# Patient Record
Sex: FEMALE | Race: WHITE | Employment: UNEMPLOYED | URBAN - METROPOLITAN AREA
[De-identification: names, ages, dates, MRNs, and addresses within clinical notes are randomized per-mention and may not be internally consistent; named-entity substitution may affect disease eponyms.]

---

## 2018-04-03 ENCOUNTER — OFFICE VISIT (OUTPATIENT)
Dept: FAMILY MEDICINE CLINIC | Age: 53
End: 2018-04-03

## 2018-04-03 ENCOUNTER — TELEPHONE (OUTPATIENT)
Dept: FAMILY MEDICINE CLINIC | Age: 53
End: 2018-04-03

## 2018-04-03 VITALS
OXYGEN SATURATION: 97 % | TEMPERATURE: 98.5 F | HEART RATE: 84 BPM | DIASTOLIC BLOOD PRESSURE: 100 MMHG | HEIGHT: 67 IN | SYSTOLIC BLOOD PRESSURE: 142 MMHG | WEIGHT: 293 LBS | BODY MASS INDEX: 45.99 KG/M2

## 2018-04-03 DIAGNOSIS — R20.2 NUMBNESS AND TINGLING IN LEFT HAND: ICD-10-CM

## 2018-04-03 DIAGNOSIS — R43.8 METALLIC TASTE: ICD-10-CM

## 2018-04-03 DIAGNOSIS — L11.1 GROVER'S DISEASE: ICD-10-CM

## 2018-04-03 DIAGNOSIS — Z91.09 ENVIRONMENTAL ALLERGIES: ICD-10-CM

## 2018-04-03 DIAGNOSIS — R20.0 NUMBNESS AND TINGLING IN LEFT HAND: ICD-10-CM

## 2018-04-03 DIAGNOSIS — I10 ESSENTIAL HYPERTENSION: Primary | ICD-10-CM

## 2018-04-03 DIAGNOSIS — R06.83 SNORING: ICD-10-CM

## 2018-04-03 DIAGNOSIS — E66.09 OBESITY DUE TO EXCESS CALORIES WITH SERIOUS COMORBIDITY, UNSPECIFIED CLASSIFICATION: ICD-10-CM

## 2018-04-03 DIAGNOSIS — H93.13 TINNITUS OF BOTH EARS: ICD-10-CM

## 2018-04-03 DIAGNOSIS — I10 ESSENTIAL HYPERTENSION: ICD-10-CM

## 2018-04-03 LAB
BASOPHILS ABSOLUTE: 0.1 K/UL (ref 0–0.2)
BASOPHILS RELATIVE PERCENT: 1.2 %
EOSINOPHILS ABSOLUTE: 0.2 K/UL (ref 0–0.6)
EOSINOPHILS RELATIVE PERCENT: 4.7 %
HCT VFR BLD CALC: 42.7 % (ref 36–48)
HEMOGLOBIN: 14.3 G/DL (ref 12–16)
LYMPHOCYTES ABSOLUTE: 1.4 K/UL (ref 1–5.1)
LYMPHOCYTES RELATIVE PERCENT: 26 %
MCH RBC QN AUTO: 29.7 PG (ref 26–34)
MCHC RBC AUTO-ENTMCNC: 33.4 G/DL (ref 31–36)
MCV RBC AUTO: 89 FL (ref 80–100)
MONOCYTES ABSOLUTE: 0.5 K/UL (ref 0–1.3)
MONOCYTES RELATIVE PERCENT: 8.7 %
NEUTROPHILS ABSOLUTE: 3.1 K/UL (ref 1.7–7.7)
NEUTROPHILS RELATIVE PERCENT: 59.4 %
PDW BLD-RTO: 13.8 % (ref 12.4–15.4)
PLATELET # BLD: 200 K/UL (ref 135–450)
PMV BLD AUTO: 8.7 FL (ref 5–10.5)
RBC # BLD: 4.8 M/UL (ref 4–5.2)
WBC # BLD: 5.3 K/UL (ref 4–11)

## 2018-04-03 PROCEDURE — 99204 OFFICE O/P NEW MOD 45 MIN: CPT | Performed by: FAMILY MEDICINE

## 2018-04-03 RX ORDER — LOSARTAN POTASSIUM 50 MG/1
50 TABLET ORAL DAILY
Qty: 30 TABLET | Refills: 3 | Status: SHIPPED | OUTPATIENT
Start: 2018-04-03 | End: 2018-04-24 | Stop reason: SDUPTHER

## 2018-04-03 RX ORDER — ASPIRIN 81 MG/1
81 TABLET, CHEWABLE ORAL
COMMUNITY
Start: 2018-01-04

## 2018-04-03 RX ORDER — MONTELUKAST SODIUM 10 MG/1
10 TABLET ORAL DAILY
COMMUNITY
Start: 2018-03-22 | End: 2018-10-29 | Stop reason: SDUPTHER

## 2018-04-03 RX ORDER — AZELASTINE HCL 205.5 UG/1
2 SPRAY NASAL PRN
COMMUNITY
Start: 2018-02-05 | End: 2018-10-29 | Stop reason: SDUPTHER

## 2018-04-03 RX ORDER — METOPROLOL SUCCINATE 25 MG/1
25 TABLET, EXTENDED RELEASE ORAL DAILY
COMMUNITY
End: 2018-04-03

## 2018-04-04 LAB
A/G RATIO: 1.5 (ref 1.1–2.2)
ALBUMIN SERPL-MCNC: 3.9 G/DL (ref 3.4–5)
ALP BLD-CCNC: 74 U/L (ref 40–129)
ALT SERPL-CCNC: 23 U/L (ref 10–40)
ANA INTERPRETATION: NORMAL
ANION GAP SERPL CALCULATED.3IONS-SCNC: 12 MMOL/L (ref 3–16)
ANTI-NUCLEAR ANTIBODY (ANA): NEGATIVE
AST SERPL-CCNC: 18 U/L (ref 15–37)
BILIRUB SERPL-MCNC: 0.4 MG/DL (ref 0–1)
BUN BLDV-MCNC: 11 MG/DL (ref 7–20)
CALCIUM SERPL-MCNC: 8.4 MG/DL (ref 8.3–10.6)
CHLORIDE BLD-SCNC: 104 MMOL/L (ref 99–110)
CO2: 27 MMOL/L (ref 21–32)
CREAT SERPL-MCNC: 0.7 MG/DL (ref 0.6–1.1)
GFR AFRICAN AMERICAN: >60
GFR NON-AFRICAN AMERICAN: >60
GLOBULIN: 2.6 G/DL
GLUCOSE BLD-MCNC: 90 MG/DL (ref 70–99)
POTASSIUM SERPL-SCNC: 4.8 MMOL/L (ref 3.5–5.1)
SEDIMENTATION RATE, ERYTHROCYTE: 8 MM/HR (ref 0–30)
SODIUM BLD-SCNC: 143 MMOL/L (ref 136–145)
TOTAL PROTEIN: 6.5 G/DL (ref 6.4–8.2)
TSH SERPL DL<=0.05 MIU/L-ACNC: 2.01 UIU/ML (ref 0.27–4.2)
VITAMIN B-12: 474 PG/ML (ref 211–911)

## 2018-04-05 LAB — METHYLMALONIC ACID: 0.15 UMOL/L (ref 0–0.4)

## 2018-04-24 ENCOUNTER — OFFICE VISIT (OUTPATIENT)
Dept: FAMILY MEDICINE CLINIC | Age: 53
End: 2018-04-24

## 2018-04-24 VITALS
HEIGHT: 67 IN | BODY MASS INDEX: 45.99 KG/M2 | WEIGHT: 293 LBS | SYSTOLIC BLOOD PRESSURE: 146 MMHG | DIASTOLIC BLOOD PRESSURE: 86 MMHG

## 2018-04-24 DIAGNOSIS — J30.2 CHRONIC SEASONAL ALLERGIC RHINITIS, UNSPECIFIED TRIGGER: ICD-10-CM

## 2018-04-24 DIAGNOSIS — I10 ESSENTIAL HYPERTENSION: Primary | ICD-10-CM

## 2018-04-24 DIAGNOSIS — R43.8 METALLIC TASTE: ICD-10-CM

## 2018-04-24 DIAGNOSIS — R42 DIZZINESS: ICD-10-CM

## 2018-04-24 DIAGNOSIS — R20.2 TINGLING: ICD-10-CM

## 2018-04-24 DIAGNOSIS — H93.13 TINNITUS OF BOTH EARS: ICD-10-CM

## 2018-04-24 DIAGNOSIS — E66.01 MORBID OBESITY (HCC): ICD-10-CM

## 2018-04-24 DIAGNOSIS — F41.9 ANXIETY: ICD-10-CM

## 2018-04-24 PROCEDURE — 99214 OFFICE O/P EST MOD 30 MIN: CPT | Performed by: FAMILY MEDICINE

## 2018-04-24 PROCEDURE — G0444 DEPRESSION SCREEN ANNUAL: HCPCS | Performed by: FAMILY MEDICINE

## 2018-04-24 RX ORDER — LOSARTAN POTASSIUM 100 MG/1
100 TABLET ORAL DAILY
Qty: 30 TABLET | Refills: 2 | Status: SHIPPED | OUTPATIENT
Start: 2018-04-24 | End: 2018-07-10 | Stop reason: SDUPTHER

## 2018-04-24 ASSESSMENT — PATIENT HEALTH QUESTIONNAIRE - PHQ9
SUM OF ALL RESPONSES TO PHQ9 QUESTIONS 1 & 2: 2
SUM OF ALL RESPONSES TO PHQ QUESTIONS 1-9: 2
2. FEELING DOWN, DEPRESSED OR HOPELESS: 1
1. LITTLE INTEREST OR PLEASURE IN DOING THINGS: 1

## 2018-05-29 ENCOUNTER — OFFICE VISIT (OUTPATIENT)
Dept: FAMILY MEDICINE CLINIC | Age: 53
End: 2018-05-29

## 2018-05-29 VITALS
HEIGHT: 67 IN | SYSTOLIC BLOOD PRESSURE: 132 MMHG | BODY MASS INDEX: 45.99 KG/M2 | DIASTOLIC BLOOD PRESSURE: 76 MMHG | WEIGHT: 293 LBS | OXYGEN SATURATION: 98 % | HEART RATE: 65 BPM | RESPIRATION RATE: 12 BRPM

## 2018-05-29 DIAGNOSIS — F41.9 ANXIETY: ICD-10-CM

## 2018-05-29 DIAGNOSIS — R42 DIZZINESS: ICD-10-CM

## 2018-05-29 DIAGNOSIS — Z23 NEED FOR TDAP VACCINATION: ICD-10-CM

## 2018-05-29 DIAGNOSIS — E66.01 MORBID OBESITY (HCC): ICD-10-CM

## 2018-05-29 DIAGNOSIS — R20.2 TINGLING: ICD-10-CM

## 2018-05-29 DIAGNOSIS — R43.8 METALLIC TASTE: ICD-10-CM

## 2018-05-29 DIAGNOSIS — I10 ESSENTIAL HYPERTENSION: ICD-10-CM

## 2018-05-29 DIAGNOSIS — Z00.00 HEALTHCARE MAINTENANCE: Primary | ICD-10-CM

## 2018-05-29 DIAGNOSIS — Z00.00 HEALTHCARE MAINTENANCE: ICD-10-CM

## 2018-05-29 DIAGNOSIS — H93.13 TINNITUS OF BOTH EARS: ICD-10-CM

## 2018-05-29 LAB
ANION GAP SERPL CALCULATED.3IONS-SCNC: 10 MMOL/L (ref 3–16)
BUN BLDV-MCNC: 11 MG/DL (ref 7–20)
CALCIUM SERPL-MCNC: 8.8 MG/DL (ref 8.3–10.6)
CHLORIDE BLD-SCNC: 104 MMOL/L (ref 99–110)
CHOLESTEROL, TOTAL: 161 MG/DL (ref 0–199)
CO2: 28 MMOL/L (ref 21–32)
CREAT SERPL-MCNC: 0.7 MG/DL (ref 0.6–1.1)
GFR AFRICAN AMERICAN: >60
GFR NON-AFRICAN AMERICAN: >60
GLUCOSE BLD-MCNC: 98 MG/DL (ref 70–99)
HDLC SERPL-MCNC: 66 MG/DL (ref 40–60)
HEPATITIS C ANTIBODY INTERPRETATION: NORMAL
LDL CHOLESTEROL CALCULATED: 77 MG/DL
POTASSIUM SERPL-SCNC: 5.2 MMOL/L (ref 3.5–5.1)
SODIUM BLD-SCNC: 142 MMOL/L (ref 136–145)
TRIGL SERPL-MCNC: 88 MG/DL (ref 0–150)
VLDLC SERPL CALC-MCNC: 18 MG/DL

## 2018-05-29 PROCEDURE — 90715 TDAP VACCINE 7 YRS/> IM: CPT | Performed by: FAMILY MEDICINE

## 2018-05-29 PROCEDURE — 99396 PREV VISIT EST AGE 40-64: CPT | Performed by: FAMILY MEDICINE

## 2018-05-29 PROCEDURE — 90471 IMMUNIZATION ADMIN: CPT | Performed by: FAMILY MEDICINE

## 2018-05-30 LAB
HIV AG/AB: NORMAL
HIV ANTIGEN: NORMAL
HIV-1 ANTIBODY: NORMAL
HIV-2 AB: NORMAL

## 2018-06-25 ENCOUNTER — OFFICE VISIT (OUTPATIENT)
Dept: ENT CLINIC | Age: 53
End: 2018-06-25

## 2018-06-25 VITALS
BODY MASS INDEX: 44.41 KG/M2 | DIASTOLIC BLOOD PRESSURE: 100 MMHG | HEART RATE: 74 BPM | SYSTOLIC BLOOD PRESSURE: 153 MMHG | WEIGHT: 293 LBS | TEMPERATURE: 97.4 F | HEIGHT: 68 IN

## 2018-06-25 DIAGNOSIS — H93.13 TINNITUS OF BOTH EARS: Primary | ICD-10-CM

## 2018-06-25 DIAGNOSIS — R43.9 TASTE DISORDER: ICD-10-CM

## 2018-06-25 PROCEDURE — 99244 OFF/OP CNSLTJ NEW/EST MOD 40: CPT | Performed by: OTOLARYNGOLOGY

## 2018-08-13 DIAGNOSIS — F41.9 ANXIETY: ICD-10-CM

## 2018-08-27 ENCOUNTER — OFFICE VISIT (OUTPATIENT)
Dept: FAMILY MEDICINE CLINIC | Age: 53
End: 2018-08-27

## 2018-08-27 VITALS
OXYGEN SATURATION: 98 % | WEIGHT: 293 LBS | HEART RATE: 81 BPM | BODY MASS INDEX: 45.99 KG/M2 | HEIGHT: 67 IN | SYSTOLIC BLOOD PRESSURE: 142 MMHG | DIASTOLIC BLOOD PRESSURE: 100 MMHG

## 2018-08-27 DIAGNOSIS — R52 VAGUE BODILY DISCOMFORT: ICD-10-CM

## 2018-08-27 DIAGNOSIS — H93.13 TINNITUS OF BOTH EARS: ICD-10-CM

## 2018-08-27 DIAGNOSIS — F41.9 ANXIETY: ICD-10-CM

## 2018-08-27 DIAGNOSIS — E66.01 MORBID OBESITY (HCC): ICD-10-CM

## 2018-08-27 DIAGNOSIS — I10 ESSENTIAL HYPERTENSION: Primary | ICD-10-CM

## 2018-08-27 DIAGNOSIS — I10 ESSENTIAL HYPERTENSION: ICD-10-CM

## 2018-08-27 PROCEDURE — 99214 OFFICE O/P EST MOD 30 MIN: CPT | Performed by: FAMILY MEDICINE

## 2018-08-27 RX ORDER — HYDROCHLOROTHIAZIDE 12.5 MG/1
12.5 CAPSULE, GELATIN COATED ORAL EVERY MORNING
Qty: 30 CAPSULE | Refills: 3 | Status: SHIPPED | OUTPATIENT
Start: 2018-08-27 | End: 2018-12-11 | Stop reason: SDUPTHER

## 2018-08-27 NOTE — PROGRESS NOTES
Brittney Milian   YOB: 1965    Date of Visit:  8/27/2018    Chief Complaint   Patient presents with    3 Month Follow-Up       Allergies   Allergen Reactions    Balsam [place] Oil [Amberderm] Rash     Contact dermatitis - typically found in hemorroid cream     Outpatient Prescriptions Marked as Taking for the 8/27/18 encounter (Office Visit) with Ayse Verde MD   Medication Sig Dispense Refill    hydrochlorothiazide (MICROZIDE) 12.5 MG capsule Take 1 capsule by mouth every morning 30 capsule 3    sertraline (ZOLOFT) 50 MG tablet TAKE ONE TABLET BY MOUTH DAILY 30 tablet 2    losartan (COZAAR) 100 MG tablet TAKE ONE TABLET BY MOUTH DAILY 30 tablet 1    aspirin 81 MG chewable tablet Take 81 mg by mouth      azelastine HCl 0.15 % SOLN 2 sprays by Nasal route as needed      montelukast (SINGULAIR) 10 MG tablet Take 10 mg by mouth daily           Vitals:    08/27/18 1113   BP: (!) 150/100   Site: Left Arm   Position: Sitting   Cuff Size: Large Adult   Pulse: 81   SpO2: 98%   Weight: (!) 312 lb 9.6 oz (141.8 kg)   Height: 5' 6.75\" (1.695 m)     Body mass index is 49.33 kg/m². Wt Readings from Last 3 Encounters:   08/27/18 (!) 312 lb 9.6 oz (141.8 kg)   06/25/18 (!) 312 lb 12.8 oz (141.9 kg)   05/29/18 (!) 316 lb 12.8 oz (143.7 kg)     BP Readings from Last 3 Encounters:   08/27/18 (!) 150/100   06/25/18 (!) 153/100   05/29/18 132/76        HPI    HTN:  BP runs between 140's-160's at home- has and arm machine she uses on her forarm.  Currently on Cozaar- increased dose 4/24/18. No symptoms concerning for end organ damage.  1/2018 started on meds for HTN. Started on lisinopril- stopped given concern for metallic taste. Tried amlodipine and still had the metallic taste. Then tried metoprolol and still had the taste and wanted to go off of it.       Feels like she isn't wired correctly: Trembling in the fingers: notices it when typing.  Feeling off balance off and on.      Tinnitus:  Bilateral.  s/p eval by ENT - nl hearing test.      Anxiety:  stable zoloft.       Morbid obesity:  Trying to exercise more. She denies any snoring. She denies any apnea. Reports she is eating healthy. Doesn't drink ETOH.      Riddleton's Disease: Dx 2005.  Uses topical cream which doesn't help much. Sees dermatology.      Seasonal allergies: Started singulair in Jan 2018 - prn. Hx of seeing allergist once 1/2018. Hx taking nasal spray which cleared up her cough and so she stopped taking it. Social History     Social History    Marital status:      Spouse name: N/A    Number of children: N/A    Years of education: N/A     Occupational History    Not on file. Social History Main Topics    Smoking status: Never Smoker    Smokeless tobacco: Never Used    Alcohol use No    Drug use: No    Sexual activity: Not on file     Other Topics Concern    Not on file     Social History Narrative    No narrative on file         Review of Systems  As documented in the HPI. No cp or greg. Physical Exam   Constitutional: She appears well-developed and well-nourished. No distress. HENT:   Head: Normocephalic and atraumatic. Cardiovascular: Normal rate, regular rhythm and normal heart sounds. No murmur heard. Pulmonary/Chest: Effort normal and breath sounds normal. No respiratory distress. Neurological: She is alert. Skin: Skin is warm and dry. Psychiatric: She has a normal mood and affect. Her behavior is normal.         Assessment/Plan     1. Essential hypertension  Discussed options. Will add hydrochlorothiazide. Monitor blood pressure and let me know if there is any issues. - hydrochlorothiazide (MICROZIDE) 12.5 MG capsule; Take 1 capsule by mouth every morning  Dispense: 30 capsule; Refill: 3  - BASIC METABOLIC PANEL; Future    2. Morbid obesity ([address]  Counseled on lifestyle modifications including diet and exercise. 3. Tinnitus of both ears  ENT note reviewed. 4. Anxiety  Stable.

## 2018-08-28 LAB
ANION GAP SERPL CALCULATED.3IONS-SCNC: 13 MMOL/L (ref 3–16)
BUN BLDV-MCNC: 15 MG/DL (ref 7–20)
CALCIUM SERPL-MCNC: 9.1 MG/DL (ref 8.3–10.6)
CHLORIDE BLD-SCNC: 105 MMOL/L (ref 99–110)
CO2: 26 MMOL/L (ref 21–32)
CREAT SERPL-MCNC: 0.7 MG/DL (ref 0.6–1.1)
GFR AFRICAN AMERICAN: >60
GFR NON-AFRICAN AMERICAN: >60
GLUCOSE BLD-MCNC: 107 MG/DL (ref 70–99)
POTASSIUM SERPL-SCNC: 4.8 MMOL/L (ref 3.5–5.1)
SODIUM BLD-SCNC: 144 MMOL/L (ref 136–145)

## 2018-08-31 DIAGNOSIS — I10 ESSENTIAL HYPERTENSION: ICD-10-CM

## 2018-09-04 RX ORDER — LOSARTAN POTASSIUM 100 MG/1
100 TABLET ORAL DAILY
Qty: 30 TABLET | Refills: 2 | Status: SHIPPED | OUTPATIENT
Start: 2018-09-04 | End: 2018-12-11 | Stop reason: SDUPTHER

## 2018-09-04 NOTE — TELEPHONE ENCOUNTER
Last Fill 7/10/18  Last Office Visit 8/27/18  Return in about 2 months (around 10/27/2018) for Chronic conditions.      Pending Appointments 10/29/18

## 2018-10-29 ENCOUNTER — OFFICE VISIT (OUTPATIENT)
Dept: FAMILY MEDICINE CLINIC | Age: 53
End: 2018-10-29
Payer: COMMERCIAL

## 2018-10-29 VITALS
SYSTOLIC BLOOD PRESSURE: 138 MMHG | BODY MASS INDEX: 45.99 KG/M2 | DIASTOLIC BLOOD PRESSURE: 88 MMHG | HEART RATE: 81 BPM | OXYGEN SATURATION: 96 % | WEIGHT: 293 LBS | TEMPERATURE: 98.8 F | RESPIRATION RATE: 12 BRPM | HEIGHT: 67 IN

## 2018-10-29 DIAGNOSIS — J30.2 SEASONAL ALLERGIES: ICD-10-CM

## 2018-10-29 DIAGNOSIS — R73.9 ELEVATED BLOOD SUGAR: ICD-10-CM

## 2018-10-29 DIAGNOSIS — I10 ESSENTIAL HYPERTENSION: ICD-10-CM

## 2018-10-29 DIAGNOSIS — I10 ESSENTIAL HYPERTENSION: Primary | ICD-10-CM

## 2018-10-29 DIAGNOSIS — F41.9 ANXIETY: ICD-10-CM

## 2018-10-29 LAB
ANION GAP SERPL CALCULATED.3IONS-SCNC: 16 MMOL/L (ref 3–16)
BUN BLDV-MCNC: 13 MG/DL (ref 7–20)
CALCIUM SERPL-MCNC: 9.2 MG/DL (ref 8.3–10.6)
CHLORIDE BLD-SCNC: 101 MMOL/L (ref 99–110)
CO2: 26 MMOL/L (ref 21–32)
CREAT SERPL-MCNC: 0.7 MG/DL (ref 0.6–1.1)
GFR AFRICAN AMERICAN: >60
GFR NON-AFRICAN AMERICAN: >60
GLUCOSE BLD-MCNC: 131 MG/DL (ref 70–99)
POTASSIUM SERPL-SCNC: 4 MMOL/L (ref 3.5–5.1)
SODIUM BLD-SCNC: 143 MMOL/L (ref 136–145)

## 2018-10-29 PROCEDURE — 99214 OFFICE O/P EST MOD 30 MIN: CPT | Performed by: FAMILY MEDICINE

## 2018-10-29 RX ORDER — MONTELUKAST SODIUM 10 MG/1
10 TABLET ORAL DAILY
Qty: 90 TABLET | Refills: 1 | Status: SHIPPED | OUTPATIENT
Start: 2018-10-29 | End: 2019-04-10 | Stop reason: SDUPTHER

## 2018-10-29 RX ORDER — AZELASTINE HCL 205.5 UG/1
2 SPRAY NASAL PRN
Qty: 100 ML | Refills: 2 | Status: SHIPPED | OUTPATIENT
Start: 2018-10-29

## 2018-10-29 NOTE — PROGRESS NOTES
Isabella Joshi   YOB: 1965    Date of Visit:  10/29/2018    Chief Complaint   Patient presents with    Hypertension       Allergies   Allergen Reactions    Balsam [place] Oil [Amberderm] Rash     Contact dermatitis - typically found in hemorroid cream     Outpatient Prescriptions Marked as Taking for the 10/29/18 encounter (Office Visit) with Halima Hale MD   Medication Sig Dispense Refill    montelukast (SINGULAIR) 10 MG tablet Take 1 tablet by mouth daily 90 tablet 1    sertraline (ZOLOFT) 50 MG tablet TAKE ONE TABLET BY MOUTH DAILY 90 tablet 1    azelastine HCl 0.15 % SOLN 2 sprays by Nasal route as needed (allergies) 100 mL 2    losartan (COZAAR) 100 MG tablet Take 1 tablet by mouth daily 30 tablet 2    hydrochlorothiazide (MICROZIDE) 12.5 MG capsule Take 1 capsule by mouth every morning 30 capsule 3    aspirin 81 MG chewable tablet Take 81 mg by mouth           Vitals:    10/29/18 0910 10/29/18 0928   BP: (!) 138/90 138/88   Site: Left Wrist Left Wrist   Position: Sitting Sitting   Cuff Size: Large Adult Large Adult   Pulse: 81    Resp: 12    Temp: 98.8 °F (37.1 °C)    TempSrc: Oral    SpO2: 96%    Weight: (!) 320 lb 6.4 oz (145.3 kg)    Height: 5' 6.75\" (1.695 m)      Body mass index is 50.56 kg/m². Wt Readings from Last 3 Encounters:   10/29/18 (!) 320 lb 6.4 oz (145.3 kg)   08/27/18 (!) 312 lb 9.6 oz (141.8 kg)   06/25/18 (!) 312 lb 12.8 oz (141.9 kg)     BP Readings from Last 3 Encounters:   10/29/18 138/88   08/27/18 (!) 142/100   06/25/18 (!) 153/100        HPI    HTN:  BP runs between 120-130''s at home- has and arm machine she uses on her forarm.  Currently on Cozaar- increased dose 4/24/18 AND hctz. No symptoms concerning for end organ damage.  1/2018 started on meds for HTN. Started on lisinopril- stopped given concern for metallic taste. Tried amlodipine and still had the metallic taste. Then tried metoprolol and still had the taste and wanted to go off of it.

## 2018-10-30 LAB
ESTIMATED AVERAGE GLUCOSE: 122.6 MG/DL
HBA1C MFR BLD: 5.9 %

## 2018-12-11 DIAGNOSIS — I10 ESSENTIAL HYPERTENSION: ICD-10-CM

## 2018-12-11 RX ORDER — HYDROCHLOROTHIAZIDE 12.5 MG/1
CAPSULE, GELATIN COATED ORAL
Qty: 30 CAPSULE | Refills: 5 | Status: SHIPPED | OUTPATIENT
Start: 2018-12-11 | End: 2019-04-11 | Stop reason: SDUPTHER

## 2018-12-11 RX ORDER — LOSARTAN POTASSIUM 100 MG/1
TABLET ORAL
Qty: 30 TABLET | Refills: 5 | Status: SHIPPED | OUTPATIENT
Start: 2018-12-11 | End: 2019-04-11 | Stop reason: SDUPTHER

## 2018-12-11 NOTE — TELEPHONE ENCOUNTER
Medication:   Requested Prescriptions     Pending Prescriptions Disp Refills    hydrochlorothiazide (MICROZIDE) 12.5 MG capsule [Pharmacy Med Name: HYDROCHLOROTHIAZIDE 12.5 MG CP] 30 capsule 2     Sig: TAKE ONE CAPSULE BY MOUTH EVERY MORNING    losartan (COZAAR) 100 MG tablet [Pharmacy Med Name: LOSARTAN POTASSIUM 100 MG TAB] 30 tablet 1     Sig: TAKE ONE TABLET BY MOUTH DAILY        Last Filled: HCTZ: 8/27/2018 #30 w/ 3 refills   Losartan: 9/4/2018 #30 w. 2 refills    Patient Phone Number: [phone] (home)      Last appt: 10.29.2018  Return in about 7 months (around 5/29/2019) for Physical.     Next appt: Visit date not found    Last OARRS: No flowsheet data found.

## 2019-04-10 DIAGNOSIS — J30.2 SEASONAL ALLERGIES: ICD-10-CM

## 2019-04-11 DIAGNOSIS — I10 ESSENTIAL HYPERTENSION: ICD-10-CM

## 2019-04-11 RX ORDER — MONTELUKAST SODIUM 10 MG/1
10 TABLET ORAL DAILY
Qty: 90 TABLET | Refills: 0 | Status: SHIPPED | OUTPATIENT
Start: 2019-04-11 | End: 2019-07-12 | Stop reason: SDUPTHER

## 2019-04-12 NOTE — TELEPHONE ENCOUNTER
Medication:   Requested Prescriptions     Pending Prescriptions Disp Refills    losartan (COZAAR) 100 MG tablet [Pharmacy Med Name: LOSARTAN POTASSIUM 100 MG TAB] 30 tablet 4     Sig: TAKE ONE TABLET BY MOUTH DAILY    hydrochlorothiazide (MICROZIDE) 12.5 MG capsule [Pharmacy Med Name: hydroCHLOROthiazide 12.5 MG CAPSULE] 30 capsule 4     Sig: TAKE ONE CAPSULE BY MOUTH EVERY MORNING        Last Filled:  12.11.2018 # 30 w/ 5 refills, each    Patient Phone Number: [phone] (home)      Last appt: 10/29/2018   Return in about 7 months (around 5/29/2019) for Physical.     Next appt: Visit date not found - appt reminder sent    Last OARRS: No flowsheet data found.

## 2019-04-15 RX ORDER — LOSARTAN POTASSIUM 100 MG/1
TABLET ORAL
Qty: 30 TABLET | Refills: 1 | Status: SHIPPED | OUTPATIENT
Start: 2019-04-15 | End: 2019-07-02

## 2019-04-15 RX ORDER — HYDROCHLOROTHIAZIDE 12.5 MG/1
CAPSULE, GELATIN COATED ORAL
Qty: 30 CAPSULE | Refills: 1 | Status: SHIPPED | OUTPATIENT
Start: 2019-04-15 | End: 2019-08-03 | Stop reason: SDUPTHER

## 2019-05-07 ENCOUNTER — OFFICE VISIT (OUTPATIENT)
Dept: FAMILY MEDICINE CLINIC | Age: 54
End: 2019-05-07
Payer: COMMERCIAL

## 2019-05-07 VITALS
OXYGEN SATURATION: 99 % | RESPIRATION RATE: 12 BRPM | WEIGHT: 293 LBS | DIASTOLIC BLOOD PRESSURE: 84 MMHG | BODY MASS INDEX: 45.99 KG/M2 | HEART RATE: 65 BPM | HEIGHT: 67 IN | SYSTOLIC BLOOD PRESSURE: 138 MMHG

## 2019-05-07 DIAGNOSIS — J30.2 CHRONIC SEASONAL ALLERGIC RHINITIS: ICD-10-CM

## 2019-05-07 DIAGNOSIS — E66.01 MORBID OBESITY (HCC): ICD-10-CM

## 2019-05-07 DIAGNOSIS — Z00.00 HEALTHCARE MAINTENANCE: Primary | ICD-10-CM

## 2019-05-07 DIAGNOSIS — K58.0 IRRITABLE BOWEL SYNDROME WITH DIARRHEA: ICD-10-CM

## 2019-05-07 DIAGNOSIS — I10 ESSENTIAL HYPERTENSION: ICD-10-CM

## 2019-05-07 PROCEDURE — 99396 PREV VISIT EST AGE 40-64: CPT | Performed by: FAMILY MEDICINE

## 2019-05-07 RX ORDER — VALSARTAN 160 MG/1
160 TABLET ORAL DAILY
Qty: 90 TABLET | Refills: 1 | Status: SHIPPED | OUTPATIENT
Start: 2019-05-07 | End: 2019-06-09 | Stop reason: SDUPTHER

## 2019-05-07 RX ORDER — DICYCLOMINE HYDROCHLORIDE 10 MG/1
10 CAPSULE ORAL 4 TIMES DAILY PRN
Qty: 120 CAPSULE | Refills: 1 | Status: SHIPPED | OUTPATIENT
Start: 2019-05-07 | End: 2020-01-03

## 2019-05-07 ASSESSMENT — PATIENT HEALTH QUESTIONNAIRE - PHQ9: DEPRESSION UNABLE TO ASSESS: URGENT/EMERGENT SITUATION

## 2019-05-07 NOTE — PROGRESS NOTES
History and Physical      Chief Complaint:   Karen Cabrera is a 46 y.o. female who presents for complete physical examination. Chief Complaint   Patient presents with    Annual Exam     not fasting today     6 Month Follow-Up    Hypertension     discuss recalled medication alternative    Irritable Bowel Syndrome     IBS w/ diarrhea     Discuss Labs     titers for MMR        HPI:   Going to [place] in June- [place] and the [address] HTN:  BP has been at goal at home - has and arm machine she uses on her forarm. With recall would like to switch her cozaar.   Currently on Cozaar- increased dose 4/24/18 AND hctz.  No symptoms concerning for end organ damage.  1/2018 started on meds for HTN. Started on lisinopril- stopped given concern for metallic taste. Tried amlodipine and still had the metallic taste. Then tried metoprolol and still had the taste and wanted to go off of it. IBS:  Hx of IBS years ago with diarrhea and constipation. The last 2-3 months has been having diarrhea. Generally going 3 times a day. No discomfort. Generally 10 min after eating has to go to the bathroom.  Taking a fiber pill which hasn't helped. No recent travel prior to this. Went to [address] in March but no change in symptoms after going. S/p colonoscopy 2016. Would like MMR titers for reassurance.      Seasonal allergies: On allegra and singulair and stable. Generally stops in June. Using Azelastine nasal spray prn. Hx of seeing allergist once 1/2018.     Tinnitus:  Bilateral.  s/p eval by ENT - nl hearing test.      Anxiety:  stable zoloft.       Morbid obesity:  Not currently working on it. She denies any snoring. She denies any apnea. Doesn't drink ETOH.      Victorino's Disease: Dx 2005.  Uses topical cream which doesn't help much.  Sees dermatology.         HM: seeing GYN.            Vitals:    05/07/19 1117   BP: 138/84   Site: Left Upper Arm   Position: Sitting   Cuff Size: Large Adult   Pulse: 65   Resp: 12   SpO2: 99% Weight: (!) 322 lb (146.1 kg)   Height: 5' 6.75\" (1.695 m)       Wt Readings from Last 3 Encounters:   05/07/19 (!) 322 lb (146.1 kg)   10/29/18 (!) 320 lb 6.4 oz (145.3 kg)   08/27/18 (!) 312 lb 9.6 oz (141.8 kg)     BP Readings from Last 3 Encounters:   05/07/19 138/84   10/29/18 138/88   08/27/18 (!) 142/100       Patient Active Problem List   Diagnosis    Victorino's disease    Hypertension    Tinnitus of both ears    Metallic taste    Dizziness    Tingling    Anxiety    Morbid obesity ([address]    Chronic seasonal allergic rhinitis    Vague bodily discomfort       Preventive Care:  Health Maintenance   Topic Date Due    Cervical cancer screen  08/08/1985    Shingles Vaccine (2 of 2) 05/06/2019    A1C test (Diabetic or Prediabetic)  10/29/2019    Potassium monitoring  10/29/2019    Creatinine monitoring  10/29/2019    Breast cancer screen  06/13/2020    Lipid screen  05/29/2023    Colon cancer screen colonoscopy  09/20/2026    DTaP/Tdap/Td vaccine (2 - Td) 05/29/2028    Flu vaccine  Completed    Hepatitis C screen  Completed    HIV screen  Completed    Pneumococcal 0-64 years Vaccine  Aged [employer] History   Administered Date(s) Administered    Hepatitis B, unspecified formulation 01/01/1997    Influenza Vaccine, unspecified formulation 10/01/2015, 09/01/2017, 09/15/2018    Pneumococcal Polysaccharide ([id number]) 05/29/2017    Tdap (Boostrix, Adacel) 05/29/2018    Zoster Subunit (Shingrix) 03/11/2019       Allergies   Allergen Reactions    Balsam [place] Oil [Amberderm] Rash     Contact dermatitis - typically found in hemorroid cream     Outpatient Medications Marked as Taking for the 5/7/19 encounter (Office Visit) with Vero August MD   Medication Sig Dispense Refill    valsartan (DIOVAN) 160 MG tablet Take 1 tablet by mouth daily 90 tablet 1    dicyclomine (BENTYL) 10 MG capsule Take 1 capsule by mouth 4 times daily as needed (IBS with diarrhea) 120 capsule 1    hydrochlorothiazide (MICROZIDE) 12.5 MG capsule TAKE ONE CAPSULE BY MOUTH EVERY MORNING 30 capsule 1    montelukast (SINGULAIR) 10 MG tablet Take 1 tablet by mouth daily 90 tablet 0    sertraline (ZOLOFT) 50 MG tablet TAKE ONE TABLET BY MOUTH DAILY 90 tablet 1    azelastine HCl 0.15 % SOLN 2 sprays by Nasal route as needed (allergies) 100 mL 2    aspirin 81 MG chewable tablet Take 81 mg by mouth         Past Medical History:   Diagnosis Date    Depression     Victorino's disease     Hypertension 2018    Infertility, female [address] [place] Pre-eclampsia      Past Surgical History:   Procedure Laterality Date    ANKLE SURGERY Left    [facility]     SECTION      1998    CHOLECYSTECTOMY      [facility]     Family History   Problem Relation Age of Onset    Cancer Maternal Aunt     Cancer Paternal Aunt     Stroke Maternal Grandmother     Cancer Paternal Grandmother     Diabetes Neg Hx     Heart Disease Neg Hx      Social History     Socioeconomic History    Marital status:      Spouse name: Not on file    Number of children: Not on file    Years of education: Not on file    Highest education level: Not on file   Occupational History    Not on file   Social Needs    Financial resource strain: Not on file    Food insecurity:     Worry: Not on file     Inability: Not on file    Transportation needs:     Medical: Not on file     Non-medical: Not on file   Tobacco Use    Smoking status: Never Smoker    Smokeless tobacco: Never Used   Substance and Sexual Activity    Alcohol use: No    Drug use: No    Sexual activity: Not on file   Lifestyle    Physical activity:     Days per week: Not on file     Minutes per session: Not on file    Stress: Not on file   Relationships    Social connections:     Talks on phone: Not on file     Gets together: Not on file     Attends Restorationism service: Not on file     Active member of club or organization: Not on file     Attends meetings of clubs or organizations: Not on file     Relationship status: Not on file    Intimate partner violence:     Fear of current or ex partner: Not on file     Emotionally abused: Not on file     Physically abused: Not on file     Forced sexual activity: Not on file   Other Topics Concern    Not on file   Social History Narrative    Not on file       Review of Systems:  A comprehensive review of systems was negative except for what was noted in the HPI. Physical Exam:   Vitals:    05/07/19 1117   BP: 138/84   Site: Left Upper Arm   Position: Sitting   Cuff Size: Large Adult   Pulse: 65   Resp: 12   SpO2: 99%   Weight: (!) 322 lb (146.1 kg)   Height: 5' 6.75\" (1.695 m)     Body mass index is 50.81 kg/m². Constitutional: She is oriented to person, place, and time. She appears well-developed and well-nourished. No distress. HEENT:   Head: Normocephalic and atraumatic. Right Ear: Tympanic membrane, external ear and ear canal normal.   Left Ear: Tympanic membrane, external ear and ear canal normal.   Nose: Nose normal.   Mouth/Throat: Oropharynx is clear and moist, and mucous membranes are normal.  There is no cervical adenopathy. Eyes: Conjunctivae and extraocular motions are normal. Pupils are equal, round, and reactive to light. Neck: Neck supple. No mass and no thyromegaly present. Cardiovascular: Normal rate, regular rhythm, normal heart sounds. Exam reveals no gallop and no friction rub. No murmur heard. Pulmonary/Chest: Effort normal and breath sounds normal. No respiratory distress. She has no wheezes, rhonchi or rales. Abdominal: Soft, non-tender. Bowel sounds are normal. She exhibits no palpable organomegaly or mass. Musculoskeletal: Normal range of motion. She exhibits no edema. Neurological: She is alert and oriented. No cranial nerve deficit. Coordination normal.   Skin: Skin is warm and dry. There is no rash or erythema. Psychiatric: She has a normal mood and affect. Her speech is normal and behavior is normal. Judgment, cognition and memory are normal.       Assessment/Plan     1. Healthcare maintenance  Annual physical exam done today. Counseled on preventative care and a healthy lifestlye. - Mumps Antibody, IgG; Future  - Rubeola Antibody, IgG; Future  - Rubella IGG; Future    2. Chronic seasonal allergic rhinitis  Stable. Continue current regimen. 3. Essential hypertension  Stable. Switch cozaar to diovan. Pt to monitor and let me know if running high. BMP in a few weeks. - valsartan (DIOVAN) 160 MG tablet; Take 1 tablet by mouth daily  Dispense: 90 tablet; Refill: 1  - BASIC METABOLIC PANEL; Future    4. Morbid obesity ([address]  Stable. Counseled on lifestyle modifications including diet and exercise. 5. Irritable bowel syndrome with diarrhea  Persistent. Food diary. Probiotic. Metamucil. Trial of bentyl. She declined recommended stool studies. - dicyclomine (BENTYL) 10 MG capsule; Take 1 capsule by mouth 4 times daily as needed (IBS with diarrhea)  Dispense: 120 capsule; Refill: 1      Discussed medications with patient, who voiced understanding of their use and indications. All questions answered. Return in about 2 months (around 7/7/2019) for Chronic conditions, HTN.

## 2019-06-09 DIAGNOSIS — I10 ESSENTIAL HYPERTENSION: ICD-10-CM

## 2019-06-09 DIAGNOSIS — F41.9 ANXIETY: ICD-10-CM

## 2019-06-10 RX ORDER — VALSARTAN 160 MG/1
TABLET ORAL
Qty: 30 TABLET | Refills: 0 | Status: SHIPPED | OUTPATIENT
Start: 2019-06-10 | End: 2019-07-02 | Stop reason: ALTCHOICE

## 2019-06-10 NOTE — TELEPHONE ENCOUNTER
Medication:   Requested Prescriptions     Pending Prescriptions Disp Refills    valsartan (DIOVAN) 160 MG tablet [Pharmacy Med Name: VALSARTAN 160 MG TABLET] 30 tablet 0     Sig: TAKE ONE TABLET BY MOUTH DAILY    sertraline (ZOLOFT) 50 MG tablet [Pharmacy Med Name: SERTRALINE HCL 50 MG TABLET] 30 tablet 1     Sig: TAKE ONE TABLET BY MOUTH DAILY        Last Filled:  Valsartan was just refilled 5/7/2019 #90 w/ 1 refill    Sertraline - 10.29.2018 #90 w/ 1 refill     Patient Phone Number: [phone] (home)     Last appt: 5/7/2019   Next appt: 7/2/2019    Last OARRS: No flowsheet data found.

## 2019-07-02 ENCOUNTER — OFFICE VISIT (OUTPATIENT)
Dept: FAMILY MEDICINE CLINIC | Age: 54
End: 2019-07-02
Payer: COMMERCIAL

## 2019-07-02 VITALS
BODY MASS INDEX: 45.99 KG/M2 | SYSTOLIC BLOOD PRESSURE: 120 MMHG | HEIGHT: 67 IN | WEIGHT: 293 LBS | RESPIRATION RATE: 12 BRPM | HEART RATE: 67 BPM | OXYGEN SATURATION: 98 % | DIASTOLIC BLOOD PRESSURE: 80 MMHG

## 2019-07-02 DIAGNOSIS — Z00.00 HEALTHCARE MAINTENANCE: ICD-10-CM

## 2019-07-02 DIAGNOSIS — E66.01 MORBID OBESITY (HCC): ICD-10-CM

## 2019-07-02 DIAGNOSIS — I10 ESSENTIAL HYPERTENSION: ICD-10-CM

## 2019-07-02 DIAGNOSIS — I10 ESSENTIAL HYPERTENSION: Primary | ICD-10-CM

## 2019-07-02 PROBLEM — K58.8 OTHER IRRITABLE BOWEL SYNDROME: Status: ACTIVE | Noted: 2019-07-02

## 2019-07-02 LAB
ANION GAP SERPL CALCULATED.3IONS-SCNC: 15 MMOL/L (ref 3–16)
BUN BLDV-MCNC: 15 MG/DL (ref 7–20)
CALCIUM SERPL-MCNC: 9.4 MG/DL (ref 8.3–10.6)
CHLORIDE BLD-SCNC: 102 MMOL/L (ref 99–110)
CHOLESTEROL, TOTAL: 154 MG/DL (ref 0–199)
CO2: 26 MMOL/L (ref 21–32)
CREAT SERPL-MCNC: 0.7 MG/DL (ref 0.6–1.1)
GFR AFRICAN AMERICAN: >60
GFR NON-AFRICAN AMERICAN: >60
GLUCOSE BLD-MCNC: 104 MG/DL (ref 70–99)
HDLC SERPL-MCNC: 67 MG/DL (ref 40–60)
LDL CHOLESTEROL CALCULATED: 68 MG/DL
POTASSIUM SERPL-SCNC: 4.5 MMOL/L (ref 3.5–5.1)
RUBELLA ANTIBODY IGG: 39.2 IU/ML
SODIUM BLD-SCNC: 143 MMOL/L (ref 136–145)
TRIGL SERPL-MCNC: 95 MG/DL (ref 0–150)
VLDLC SERPL CALC-MCNC: 19 MG/DL

## 2019-07-02 PROCEDURE — 99213 OFFICE O/P EST LOW 20 MIN: CPT | Performed by: FAMILY MEDICINE

## 2019-07-02 RX ORDER — LOSARTAN POTASSIUM 100 MG/1
TABLET ORAL
Qty: 30 TABLET | Refills: 1 | COMMUNITY
Start: 2019-07-02 | End: 2019-08-05

## 2019-07-03 LAB
ESTIMATED AVERAGE GLUCOSE: 119.8 MG/DL
HBA1C MFR BLD: 5.8 %

## 2019-07-04 LAB
MUV IGG SER QL: 119 AU/ML
RUBEOLA (MEASLES) AB IGG: >300 AU/ML

## 2019-07-12 DIAGNOSIS — J30.2 SEASONAL ALLERGIES: ICD-10-CM

## 2019-07-12 RX ORDER — MONTELUKAST SODIUM 10 MG/1
TABLET ORAL
Qty: 90 TABLET | Refills: 0 | Status: SHIPPED | OUTPATIENT
Start: 2019-07-12 | End: 2019-10-02 | Stop reason: SDUPTHER

## 2019-08-03 DIAGNOSIS — I10 ESSENTIAL HYPERTENSION: ICD-10-CM

## 2019-08-05 RX ORDER — HYDROCHLOROTHIAZIDE 12.5 MG/1
CAPSULE, GELATIN COATED ORAL
Qty: 30 CAPSULE | Refills: 0 | Status: SHIPPED | OUTPATIENT
Start: 2019-08-05 | End: 2019-09-02 | Stop reason: SDUPTHER

## 2019-08-05 RX ORDER — LOSARTAN POTASSIUM 100 MG/1
TABLET ORAL
Qty: 30 TABLET | Refills: 0 | Status: SHIPPED | OUTPATIENT
Start: 2019-08-05 | End: 2019-09-02 | Stop reason: SDUPTHER

## 2019-08-05 NOTE — TELEPHONE ENCOUNTER
Medication:   Requested Prescriptions     Pending Prescriptions Disp Refills    hydrochlorothiazide (MICROZIDE) 12.5 MG capsule [Pharmacy Med Name: hydroCHLOROthiazide 12.5 MG CAPSULE] 30 capsule 0     Sig: TAKE ONE CAPSULE BY MOUTH EVERY MORNING    losartan (COZAAR) 100 MG tablet [Pharmacy Med Name: LOSARTAN POTASSIUM 100 MG TAB] 30 tablet 0     Sig: TAKE ONE TABLET BY MOUTH DAILY        Last Filled: HCTZ - 4/15/2019 #30 w/ 1 refill   Losartan - 7/2/2019 #30 w/ 1 refill     Patient Phone Number: [phone] (home)     Last appt: 7/2/2019 Return in about 6 months (around 1/2/2020) for Chronic conditions. Next appt: Visit date not found    Last OARRS: No flowsheet data found.

## 2019-09-02 DIAGNOSIS — I10 ESSENTIAL HYPERTENSION: ICD-10-CM

## 2019-09-02 DIAGNOSIS — F41.9 ANXIETY: ICD-10-CM

## 2019-09-03 RX ORDER — HYDROCHLOROTHIAZIDE 12.5 MG/1
CAPSULE, GELATIN COATED ORAL
Qty: 30 CAPSULE | Refills: 3 | Status: SHIPPED | OUTPATIENT
Start: 2019-09-03 | End: 2019-12-20

## 2019-09-03 RX ORDER — LOSARTAN POTASSIUM 100 MG/1
TABLET ORAL
Qty: 30 TABLET | Refills: 3 | Status: SHIPPED | OUTPATIENT
Start: 2019-09-03 | End: 2019-12-20

## 2019-09-24 ENCOUNTER — OFFICE VISIT (OUTPATIENT)
Dept: FAMILY MEDICINE CLINIC | Age: 54
End: 2019-09-24
Payer: COMMERCIAL

## 2019-09-24 VITALS
WEIGHT: 293 LBS | OXYGEN SATURATION: 98 % | SYSTOLIC BLOOD PRESSURE: 118 MMHG | BODY MASS INDEX: 45.99 KG/M2 | HEART RATE: 72 BPM | HEIGHT: 67 IN | DIASTOLIC BLOOD PRESSURE: 78 MMHG

## 2019-09-24 DIAGNOSIS — B37.9 CANDIDA INFECTION: Primary | ICD-10-CM

## 2019-09-24 PROCEDURE — 99213 OFFICE O/P EST LOW 20 MIN: CPT | Performed by: NURSE PRACTITIONER

## 2019-09-24 RX ORDER — NYSTATIN 100000 U/G
OINTMENT TOPICAL
Qty: 30 G | Refills: 1 | Status: SHIPPED | OUTPATIENT
Start: 2019-09-24 | End: 2020-07-30 | Stop reason: ALTCHOICE

## 2019-09-24 ASSESSMENT — PATIENT HEALTH QUESTIONNAIRE - PHQ9
SUM OF ALL RESPONSES TO PHQ QUESTIONS 1-9: 0
SUM OF ALL RESPONSES TO PHQ9 QUESTIONS 1 & 2: 0
2. FEELING DOWN, DEPRESSED OR HOPELESS: 0
SUM OF ALL RESPONSES TO PHQ QUESTIONS 1-9: 0
1. LITTLE INTEREST OR PLEASURE IN DOING THINGS: 0

## 2019-09-24 ASSESSMENT — ENCOUNTER SYMPTOMS: SHORTNESS OF BREATH: 0

## 2019-09-24 NOTE — PROGRESS NOTES
[facility]ACT[facility]  [facility]  [facility] [address]: [phone]         2019     Noni Hendrickson (:  1965) is a 53 y.o. female, here for evaluation of the following medical concerns:    Chief Complaint   Patient presents with    Rash     underneath left breast, noticed this morning. Could tell something was going on last night but didn't look. HPI  Rash  Started yesterday  Felt wet with an odor, this morning noticed a rash  No fevers or fatigue  Itches a little  Never had it before    Review of Systems   Constitutional: Negative for activity change, appetite change, fatigue and fever. Respiratory: Negative for shortness of breath. Cardiovascular: Negative for chest pain. Skin: Positive for rash. Negative for wound. Neurological: Negative for dizziness and headaches. Prior to Visit Medications    Medication Sig Taking? Authorizing Provider   nystatin (MYCOSTATIN) 917320 UNIT/GM ointment Apply topically 2 times daily.  Yes CHANELLE Rolon CNP   losartan (COZAAR) 100 MG tablet TAKE ONE TABLET BY MOUTH DAILY Yes Betsy Lopez MD   dicyclomine (BENTYL) 10 MG capsule Take 1 capsule by mouth 4 times daily as needed (IBS with diarrhea) Yes Betsy Lopez MD   azelastine HCl 0.15 % SOLN 2 sprays by Nasal route as needed (allergies) Yes Betsy Lopez MD   aspirin 81 MG chewable tablet Take 81 mg by mouth Yes Historical Provider, MD   sertraline (ZOLOFT) 50 MG tablet TAKE ONE TABLET BY MOUTH DAILY  Betsy Lopez MD   hydrochlorothiazide (MICROZIDE) 12.5 MG capsule TAKE ONE CAPSULE BY MOUTH EVERY MORNING  Betsy Lopze MD   montelukast (SINGULAIR) 10 MG tablet TAKE ONE TABLET BY MOUTH DAILY  Doug Rodriguez APRN - CNP        Social History     Tobacco Use    Smoking status: Never Smoker    Smokeless tobacco: Never Used   Substance Use Topics    Alcohol use: No        Vitals:    19 1147   BP: 118/78   Site: Right Upper Arm   Position: Sitting   Cuff Size: Large Adult   Pulse: 72   SpO2: 98%   Weight: (!) 326 lb (147.9 kg)   Height: 5' 6.75\" (1.695 m)     Estimated body mass index is 51.44 kg/m² as calculated from the following:    Height as of this encounter: 5' 6.75\" (1.695 m). Weight as of this encounter: 326 lb (147.9 kg). Physical Exam   Constitutional: She is oriented to person, place, and time. She appears well-developed and well-nourished. HENT:   Head: Normocephalic. Cardiovascular: Normal rate, regular rhythm and normal heart sounds. Pulmonary/Chest: Effort normal and breath sounds normal.   Neurological: She is alert and oriented to person, place, and time. Skin: Rash noted. Rash is macular. There is erythema. Underneath left breast   Psychiatric: She has a normal mood and affect. ASSESSMENT/PLAN:  1. Candida infection  Stable;  Discussed the importance of keeping the area dry. Begin nystatin ointment BID.  - nystatin (MYCOSTATIN) [id number] UNIT/GM ointment; Apply topically 2 times daily. Dispense: 30 g; Refill: 1      Follow Up:     Return if symptoms worsen or fail to improve.

## 2019-10-02 DIAGNOSIS — J30.2 SEASONAL ALLERGIES: ICD-10-CM

## 2019-10-03 RX ORDER — MONTELUKAST SODIUM 10 MG/1
TABLET ORAL
Qty: 90 TABLET | Refills: 0 | Status: SHIPPED | OUTPATIENT
Start: 2019-10-03 | End: 2020-01-06

## 2019-12-20 DIAGNOSIS — I10 ESSENTIAL HYPERTENSION: ICD-10-CM

## 2019-12-20 RX ORDER — LOSARTAN POTASSIUM 100 MG/1
TABLET ORAL
Qty: 90 TABLET | Refills: 0 | Status: SHIPPED | OUTPATIENT
Start: 2019-12-20 | End: 2020-03-17

## 2019-12-20 RX ORDER — HYDROCHLOROTHIAZIDE 12.5 MG/1
CAPSULE, GELATIN COATED ORAL
Qty: 90 CAPSULE | Refills: 0 | Status: SHIPPED | OUTPATIENT
Start: 2019-12-20 | End: 2020-03-17

## 2020-01-03 ENCOUNTER — OFFICE VISIT (OUTPATIENT)
Dept: FAMILY MEDICINE CLINIC | Age: 55
End: 2020-01-03
Payer: COMMERCIAL

## 2020-01-03 VITALS
SYSTOLIC BLOOD PRESSURE: 120 MMHG | RESPIRATION RATE: 10 BRPM | BODY MASS INDEX: 45.99 KG/M2 | OXYGEN SATURATION: 95 % | DIASTOLIC BLOOD PRESSURE: 80 MMHG | HEART RATE: 74 BPM | WEIGHT: 293 LBS | HEIGHT: 67 IN

## 2020-01-03 DIAGNOSIS — R73.9 ELEVATED BLOOD SUGAR: ICD-10-CM

## 2020-01-03 DIAGNOSIS — I10 ESSENTIAL HYPERTENSION: ICD-10-CM

## 2020-01-03 LAB
ANION GAP SERPL CALCULATED.3IONS-SCNC: 13 MMOL/L (ref 3–16)
BUN BLDV-MCNC: 15 MG/DL (ref 7–20)
CALCIUM SERPL-MCNC: 9.1 MG/DL (ref 8.3–10.6)
CHLORIDE BLD-SCNC: 102 MMOL/L (ref 99–110)
CO2: 24 MMOL/L (ref 21–32)
CREAT SERPL-MCNC: 0.7 MG/DL (ref 0.6–1.1)
GFR AFRICAN AMERICAN: >60
GFR NON-AFRICAN AMERICAN: >60
GLUCOSE BLD-MCNC: 85 MG/DL (ref 70–99)
POTASSIUM SERPL-SCNC: 4.2 MMOL/L (ref 3.5–5.1)
SODIUM BLD-SCNC: 139 MMOL/L (ref 136–145)

## 2020-01-03 PROCEDURE — 99214 OFFICE O/P EST MOD 30 MIN: CPT | Performed by: FAMILY MEDICINE

## 2020-01-03 ASSESSMENT — PATIENT HEALTH QUESTIONNAIRE - PHQ9
SUM OF ALL RESPONSES TO PHQ QUESTIONS 1-9: 0
SUM OF ALL RESPONSES TO PHQ QUESTIONS 1-9: 0
2. FEELING DOWN, DEPRESSED OR HOPELESS: 0
1. LITTLE INTEREST OR PLEASURE IN DOING THINGS: 0
SUM OF ALL RESPONSES TO PHQ9 QUESTIONS 1 & 2: 0

## 2020-01-03 NOTE — PROGRESS NOTES
Isaiah Figueredo   YOB: 1965    Date of Visit:  1/3/2020    Allergies   Allergen Reactions    Balsam [place]-S[place] Oil [Amberderm] Rash     Contact dermatitis - typically found in hemorroid cream     Outpatient Medications Marked as Taking for the 1/3/20 encounter (Office Visit) with Christine John MD   Medication Sig Dispense Refill    DIGESTIVE ENZYMES PO Take by mouth      losartan (COZAAR) 100 MG tablet TAKE ONE TABLET BY MOUTH DAILY 90 tablet 0    hydrochlorothiazide (MICROZIDE) 12.5 MG capsule TAKE ONE CAPSULE BY MOUTH EVERY MORNING 90 capsule 0    montelukast (SINGULAIR) 10 MG tablet TAKE ONE TABLET BY MOUTH DAILY 90 tablet 0    nystatin (MYCOSTATIN) 180856 UNIT/GM ointment Apply topically 2 times daily. 30 g 1    sertraline (ZOLOFT) 50 MG tablet TAKE ONE TABLET BY MOUTH DAILY 30 tablet 3    azelastine HCl 0.15 % SOLN 2 sprays by Nasal route as needed (allergies) 100 mL 2    aspirin 81 MG chewable tablet Take 81 mg by mouth           Vitals:    01/03/20 1057   BP: 120/80   Site: Left Wrist   Position: Sitting   Cuff Size: Medium Adult   Pulse: 74   Resp: 10   SpO2: 95%   Weight: (!) 333 lb (151 kg)   Height: 5' 6.75\" (1.695 m)     Body mass index is 52.55 kg/m². Wt Readings from Last 3 Encounters:   01/03/20 (!) 333 lb (151 kg)   09/24/19 (!) 326 lb (147.9 kg)   07/02/19 (!) 318 lb 6.4 oz (144.4 kg)     BP Readings from Last 3 Encounters:   01/03/20 120/80   09/24/19 118/78   07/02/19 120/80        Chief Complaint   Patient presents with    Hypertension       HPI    Every year her mother gives them a big trip for [place]- went to [place] 2019. Planning to go to [facility]- once parents house sells which should be soon.      URI: Symptoms present for about a week. She reports she is significantly improved and does not feel like she needs any additional intervention at this time. Has been using Mucinex and Flonase.     HTN:  BP has been at goal at home - has and arm machine she uses on her forarm. Doing well still on current regimen. No symptoms concerning for end organ damage.  1/2018 started on meds for HTN. Started on lisinopril- stopped given concern for metallic taste. Tried amlodipine and still had the metallic taste. Then tried metoprolol and still had the taste and wanted to go off of it.     IBS:  Hx of IBS years ago with diarrhea and constipation.  Taking a fiber pill which hasn't helped. S/p colonoscopy 2016.      Seasonal allergies:  On allegra and singulair and stable. Generally stops in June. Using Azelastine nasal spray prn. Hx of seeing allergist once 1/2018.     Tinnitus:  Bilateral.  s/p eval by ENT - nl hearing test.      Anxiety:  stable zoloft.       Morbid obesity:  Not currently working on it.  She denies any snoring. She denies any apnea. Doesn't drink ETOH.      Victorino's Disease: Dx 2005.  Uses topical cream which doesn't help much. Sees dermatology.         HM: seeing GYN.           Social History     Socioeconomic History    Marital status:      Spouse name: Not on file    Number of children: Not on file    Years of education: Not on file    Highest education level: Not on file   Occupational History    Not on file   Social Needs    Financial resource strain: Not on file    Food insecurity:     Worry: Not on file     Inability: Not on file    Transportation needs:     Medical: Not on file     Non-medical: Not on file   Tobacco Use    Smoking status: Never Smoker    Smokeless tobacco: Never Used   Substance and Sexual Activity    Alcohol use: No    Drug use: No    Sexual activity: Not on file   Lifestyle    Physical activity:     Days per week: Not on file     Minutes per session: Not on file    Stress: Not on file   Relationships    Social connections:     Talks on phone: Not on file     Gets together: Not on file     Attends Latter day service: Not on file     Active member of club or organization: Not on file     Attends meetings of clubs or respiratory tract infection, unspecified type  Stable. Supportive care. Discussed medications with patient, who voiced understanding of their use and indications. All questions answered.     Return in about 6 months (around 7/3/2020) for Physical.

## 2020-01-03 NOTE — TELEPHONE ENCOUNTER
Last Fill 10/3/ 19  Last Office Visit 1/3/20   Return in about 6 months (around 7/3/2020) for Physical  Pending Appointments 6/22/20

## 2020-01-04 LAB
ESTIMATED AVERAGE GLUCOSE: 111.2 MG/DL
HBA1C MFR BLD: 5.5 %

## 2020-01-06 RX ORDER — MONTELUKAST SODIUM 10 MG/1
TABLET ORAL
Qty: 82 TABLET | Refills: 0 | Status: SHIPPED | OUTPATIENT
Start: 2020-01-06 | End: 2020-03-17

## 2020-03-17 RX ORDER — LOSARTAN POTASSIUM 100 MG/1
TABLET ORAL
Qty: 90 TABLET | Refills: 1 | Status: SHIPPED | OUTPATIENT
Start: 2020-03-17 | End: 2020-03-23

## 2020-03-17 RX ORDER — MONTELUKAST SODIUM 10 MG/1
TABLET ORAL
Qty: 82 TABLET | Refills: 1 | Status: SHIPPED | OUTPATIENT
Start: 2020-03-17 | End: 2020-07-01

## 2020-03-17 RX ORDER — HYDROCHLOROTHIAZIDE 12.5 MG/1
CAPSULE, GELATIN COATED ORAL
Qty: 90 CAPSULE | Refills: 1 | Status: SHIPPED | OUTPATIENT
Start: 2020-03-17 | End: 2020-03-23

## 2020-03-17 NOTE — TELEPHONE ENCOUNTER
Medication:   Requested Prescriptions     Pending Prescriptions Disp Refills    hydrochlorothiazide (MICROZIDE) 12.5 MG capsule [Pharmacy Med Name: hydroCHLOROthiazide 12.5 MG CAPSULE] 90 capsule 0     Sig: TAKE ONE CAPSULE BY MOUTH EVERY MORNING    losartan (COZAAR) 100 MG tablet [Pharmacy Med Name: LOSARTAN POTASSIUM 100 MG TAB] 90 tablet 0     Sig: TAKE ONE TABLET BY MOUTH DAILY        Last Filled:  12.20.2019 #90 each    Patient Phone Number: [phone] (home)     Last appt: 1/3/2020   Next appt: 6/22/2020    Last OARRS: No flowsheet data found.

## 2020-03-23 ENCOUNTER — E-VISIT (OUTPATIENT)
Dept: FAMILY MEDICINE CLINIC | Age: 55
End: 2020-03-23
Payer: COMMERCIAL

## 2020-03-23 PROCEDURE — 99421 OL DIG E/M SVC 5-10 MIN: CPT | Performed by: FAMILY MEDICINE

## 2020-03-23 RX ORDER — LOSARTAN POTASSIUM 100 MG/1
TABLET ORAL
Qty: 90 TABLET | Refills: 1 | Status: SHIPPED | OUTPATIENT
Start: 2020-03-23 | End: 2020-07-30 | Stop reason: SDUPTHER

## 2020-03-23 RX ORDER — HYDROCHLOROTHIAZIDE 12.5 MG/1
CAPSULE, GELATIN COATED ORAL
Qty: 90 CAPSULE | Refills: 1 | Status: SHIPPED | OUTPATIENT
Start: 2020-03-23 | End: 2020-07-30 | Stop reason: SDUPTHER

## 2020-03-23 ASSESSMENT — LIFESTYLE VARIABLES: SMOKING_STATUS: NO, I HAVE NEVER SMOKED

## 2020-06-05 ENCOUNTER — NURSE TRIAGE (OUTPATIENT)
Dept: OTHER | Facility: CLINIC | Age: 55
End: 2020-06-05

## 2020-06-08 ENCOUNTER — VIRTUAL VISIT (OUTPATIENT)
Dept: FAMILY MEDICINE CLINIC | Age: 55
End: 2020-06-08
Payer: COMMERCIAL

## 2020-06-08 PROBLEM — M54.32 LEFT SIDED SCIATICA: Status: ACTIVE | Noted: 2020-06-08

## 2020-06-08 PROBLEM — M72.2 PLANTAR FASCIITIS, BILATERAL: Status: ACTIVE | Noted: 2020-06-08

## 2020-06-08 PROBLEM — M25.561 CHRONIC PAIN OF RIGHT KNEE: Status: ACTIVE | Noted: 2020-06-08

## 2020-06-08 PROBLEM — G89.29 CHRONIC PAIN OF RIGHT KNEE: Status: ACTIVE | Noted: 2020-06-08

## 2020-06-08 PROCEDURE — 1036F TOBACCO NON-USER: CPT | Performed by: FAMILY MEDICINE

## 2020-06-08 PROCEDURE — 3017F COLORECTAL CA SCREEN DOC REV: CPT | Performed by: FAMILY MEDICINE

## 2020-06-08 PROCEDURE — G8427 DOCREV CUR MEDS BY ELIG CLIN: HCPCS | Performed by: FAMILY MEDICINE

## 2020-06-08 PROCEDURE — 99214 OFFICE O/P EST MOD 30 MIN: CPT | Performed by: FAMILY MEDICINE

## 2020-06-08 PROCEDURE — G8417 CALC BMI ABV UP PARAM F/U: HCPCS | Performed by: FAMILY MEDICINE

## 2020-06-08 NOTE — PROGRESS NOTES
appearance. She is not ill-appearing. HENT:      Head: Normocephalic and atraumatic. Nose: Nose normal.   Pulmonary:      Effort: Pulmonary effort is normal. No respiratory distress. Neurological:      General: No focal deficit present. Mental Status: She is alert. Psychiatric:         Mood and Affect: Mood normal.         Behavior: Behavior normal.           Assessment/Plan     1. Chronic pain of right knee  Persistent. Xray and PT. Brace. Voltaren gel. Aleve prn- risks discussed. If arthritis on xray discussed may consider an injection as well. WEight loss. - XR KNEE RIGHT (3 VIEWS); Future  - Ambulatory referral to Physical Therapy    2. Plantar fasciitis, bilateral  Resolved. 3. Left sided sciatica  Stable currently. PT. Xray low back. Return precautions reviewed. - Ambulatory referral to Physical Therapy    4. Essential hypertension  Stable. Continue current regimen. 5. Morbid obesity ([address]  Stable discussed the importance of weight loss rudi with her orthopaedic issues. She demonstrates understanding. Discussed medications with patient, who voiced understanding of their use and indications. All questions answered. Return for reschedule physical for when I'm in the office, infor for xray, fax PT order where she wants. Tammy Cardoza is being evaluated by a Virtual Visit (video visit) encounter to address concerns as mentioned above. A caregiver was present when appropriate. Due to this being a TeleHealth encounter (During ELVWI-86 public health emergency), evaluation of the following organ systems was limited: Vitals/Constitutional/EENT/Resp/CV/GI//MS/Neuro/Skin/Heme-Lymph-Imm.   Pursuant to the emergency declaration under the [facility] [place] waiver authority and the [employer] Act, this Virtual Visit was conducted with patient's (and/or legal guardian's) consent, to reduce

## 2020-07-01 RX ORDER — MONTELUKAST SODIUM 10 MG/1
TABLET ORAL
Qty: 82 TABLET | Refills: 0 | Status: SHIPPED | OUTPATIENT
Start: 2020-07-01 | End: 2021-03-23

## 2020-07-01 NOTE — TELEPHONE ENCOUNTER
Medication:   Requested Prescriptions     Pending Prescriptions Disp Refills    montelukast (SINGULAIR) 10 MG tablet [Pharmacy Med Name: MONTELUKAST SOD 10 MG TABLET] 82 tablet 0     Sig: TAKE ONE TABLET BY MOUTH DAILY        Last Filled: 3/17/2020 #82 Refills 0     Patient Phone Number: [phone] (home)     Last appt: 6/8/2020  Next appt: 7/30/2020    Last OARRS: No flowsheet data found.

## 2020-07-30 ENCOUNTER — OFFICE VISIT (OUTPATIENT)
Dept: FAMILY MEDICINE CLINIC | Age: 55
End: 2020-07-30
Payer: COMMERCIAL

## 2020-07-30 VITALS
DIASTOLIC BLOOD PRESSURE: 85 MMHG | TEMPERATURE: 97.1 F | HEIGHT: 67 IN | BODY MASS INDEX: 45.99 KG/M2 | HEART RATE: 76 BPM | OXYGEN SATURATION: 98 % | WEIGHT: 293 LBS | SYSTOLIC BLOOD PRESSURE: 119 MMHG

## 2020-07-30 DIAGNOSIS — Z00.00 HEALTHCARE MAINTENANCE: ICD-10-CM

## 2020-07-30 DIAGNOSIS — Z87.09 HISTORY OF URI (UPPER RESPIRATORY INFECTION): ICD-10-CM

## 2020-07-30 DIAGNOSIS — I10 ESSENTIAL HYPERTENSION: ICD-10-CM

## 2020-07-30 LAB
ANION GAP SERPL CALCULATED.3IONS-SCNC: 12 MMOL/L (ref 3–16)
BUN BLDV-MCNC: 13 MG/DL (ref 7–20)
CALCIUM SERPL-MCNC: 9.2 MG/DL (ref 8.3–10.6)
CHLORIDE BLD-SCNC: 103 MMOL/L (ref 99–110)
CHOLESTEROL, TOTAL: 153 MG/DL (ref 0–199)
CO2: 28 MMOL/L (ref 21–32)
CREAT SERPL-MCNC: 0.7 MG/DL (ref 0.6–1.1)
ESTIMATED AVERAGE GLUCOSE: 122.6 MG/DL
GFR AFRICAN AMERICAN: >60
GFR NON-AFRICAN AMERICAN: >60
GLUCOSE BLD-MCNC: 111 MG/DL (ref 70–99)
HBA1C MFR BLD: 5.9 %
HDLC SERPL-MCNC: 59 MG/DL (ref 40–60)
LDL CHOLESTEROL CALCULATED: 62 MG/DL
POTASSIUM SERPL-SCNC: 4 MMOL/L (ref 3.5–5.1)
SARS-COV-2 ANTIBODY, TOTAL: NEGATIVE
SODIUM BLD-SCNC: 143 MMOL/L (ref 136–145)
TRIGL SERPL-MCNC: 158 MG/DL (ref 0–150)
VLDLC SERPL CALC-MCNC: 32 MG/DL

## 2020-07-30 PROCEDURE — 1036F TOBACCO NON-USER: CPT | Performed by: FAMILY MEDICINE

## 2020-07-30 PROCEDURE — G8417 CALC BMI ABV UP PARAM F/U: HCPCS | Performed by: FAMILY MEDICINE

## 2020-07-30 PROCEDURE — G8427 DOCREV CUR MEDS BY ELIG CLIN: HCPCS | Performed by: FAMILY MEDICINE

## 2020-07-30 PROCEDURE — 3017F COLORECTAL CA SCREEN DOC REV: CPT | Performed by: FAMILY MEDICINE

## 2020-07-30 PROCEDURE — 99396 PREV VISIT EST AGE 40-64: CPT | Performed by: FAMILY MEDICINE

## 2020-07-30 RX ORDER — LOSARTAN POTASSIUM 100 MG/1
TABLET ORAL
Qty: 90 TABLET | Refills: 3 | Status: SHIPPED | OUTPATIENT
Start: 2020-07-30 | End: 2021-09-08

## 2020-07-30 RX ORDER — HYDROCHLOROTHIAZIDE 12.5 MG/1
CAPSULE, GELATIN COATED ORAL
Qty: 90 CAPSULE | Refills: 3 | Status: SHIPPED | OUTPATIENT
Start: 2020-07-30 | End: 2021-09-08

## 2020-07-30 NOTE — PROGRESS NOTES
back pain:  Has had issues off and on for 10 years. + sciatica - has pain down the leg sometimes. Has not treated it. Around Thanksgiving it was bothering her doing the dishes or standing. If someone were to massage it it would be very painful. Currently not really causing her issues though. Plantar fasciitis: has had this on both feet for 2 years. Hx of seeing podiatry and had shots and orthotics- these didn't work. The exercises have helped however.  This has resolved now and she doesn't have pain with this anymore.       Hx of L ankle fracture:  16 years ago broke her ankle jumping on a trampoline.         HM: seeing GYN.       Vitals:    07/30/20 0927   BP: 119/85   Site: Left Wrist   Position: Sitting   Pulse: 76   Temp: 97.1 °F (36.2 °C)   TempSrc: Temporal   SpO2: 98%   Weight: (!) 338 lb (153.3 kg)   Height: 5' 6.75\" (1.695 m)       Wt Readings from Last 3 Encounters:   07/30/20 (!) 338 lb (153.3 kg)   01/03/20 (!) 333 lb (151 kg)   09/24/19 (!) 326 lb (147.9 kg)     BP Readings from Last 3 Encounters:   07/30/20 119/85   01/03/20 120/80   09/24/19 118/78       Patient Active Problem List   Diagnosis    Norco's disease    Hypertension    Tinnitus of both ears    Metallic taste    Dizziness    Tingling    Anxiety    Morbid obesity (HCC)    Chronic seasonal allergic rhinitis    Vague bodily discomfort    Other irritable bowel syndrome    Chronic pain of right knee    Plantar fasciitis, bilateral    Left sided sciatica       Preventive Care:  Health Maintenance   Topic Date Due    Cervical cancer screen  08/08/1985    Flu vaccine (1) 09/01/2020    Potassium monitoring  01/03/2021    Creatinine monitoring  01/03/2021    Breast cancer screen  09/16/2021    Lipid screen  07/02/2024    Colon cancer screen colonoscopy  09/20/2026    DTaP/Tdap/Td vaccine (2 - Td) 05/29/2028    Shingles Vaccine  Completed    Hepatitis C screen  Completed    HIV screen  Completed    Hepatitis A vaccine  Aged Out    Hepatitis B vaccine  Aged Out    Hib vaccine  Aged Out    Meningococcal (ACWY) vaccine  Aged Out    Pneumococcal 0-64 years Vaccine  Aged [employer] History   Administered Date(s) Administered    Hepatitis B 1997    Influenza Vaccine, unspecified formulation 10/01/2015, 2017, 09/15/2018    Influenza Virus Vaccine 2019    Pneumococcal Polysaccharide (Prkauxjnd69) 2017    Tdap (Boostrix, Adacel) 2018    Zoster Recombinant (Shingrix) 2019, 2019       Allergies   Allergen Reactions    Balsam Rash    Balsam [place]-Castor Oil [Amberderm] Rash     Contact dermatitis - typically found in hemorroid cream     Outpatient Medications Marked as Taking for the 20 encounter (Office Visit) with Tnoia Fraga MD   Medication Sig Dispense Refill    hydroCHLOROthiazide (MICROZIDE) 12.5 MG capsule TAKE ONE CAPSULE BY MOUTH EVERY MORNING 90 capsule 3    losartan (COZAAR) 100 MG tablet TAKE ONE TABLET BY MOUTH DAILY 90 tablet 3    sertraline (ZOLOFT) 50 MG tablet TAKE ONE TABLET BY MOUTH DAILY 90 tablet 3    montelukast (SINGULAIR) 10 MG tablet TAKE ONE TABLET BY MOUTH DAILY (Patient taking differently: as needed ) 82 tablet 0    azelastine HCl 0.15 % SOLN 2 sprays by Nasal route as needed (allergies) 100 mL 2    aspirin 81 MG chewable tablet Take 81 mg by mouth         Past Medical History:   Diagnosis Date    Depression     Victorino's disease     Hypertension 2018    Infertility, female [address] [place] Pre-eclampsia      Past Surgical History:   Procedure Laterality Date    ANKLE SURGERY Left 2004    ANUS SURGERY  1996    fissure     SECTION      1998    CHOLECYSTECTOMY      LAPAROSCOPY  12    MYOMECTOMY       Family History   Problem Relation Age of Onset    Cancer Maternal Aunt     Cancer Paternal Aunt     Stroke Maternal Grandmother     Cancer Paternal Grandmother     Diabetes Neg Hx     Heart Disease Neg Hx      Social History     Socioeconomic History    Marital status:      Spouse name: Not on file    Number of children: Not on file    Years of education: Not on file    Highest education level: Not on file   Occupational History    Not on file   Social Needs    Financial resource strain: Not on file    Food insecurity     Worry: Not on file     Inability: Not on file    Transportation needs     Medical: Not on file     Non-medical: Not on file   Tobacco Use    Smoking status: Never Smoker    Smokeless tobacco: Never Used   Substance and Sexual Activity    Alcohol use: No    Drug use: No    Sexual activity: Not on file   Lifestyle    Physical activity     Days per week: Not on file     Minutes per session: Not on file    Stress: Not on file   Relationships    Social connections     Talks on phone: Not on file     Gets together: Not on file     Attends Druze service: Not on file     Active member of club or organization: Not on file     Attends meetings of clubs or organizations: Not on file     Relationship status: Not on file    Intimate partner violence     Fear of current or ex partner: Not on file     Emotionally abused: Not on file     Physically abused: Not on file     Forced sexual activity: Not on file   Other Topics Concern    Not on file   Social History Narrative    Not on file       Review of Systems:  A comprehensive review of systems was negative except for what was noted in the HPI. Physical Exam:   Vitals:    07/30/20 0927   BP: 119/85   Site: Left Wrist   Position: Sitting   Pulse: 76   Temp: 97.1 °F (36.2 °C)   TempSrc: Temporal   SpO2: 98%   Weight: (!) 338 lb (153.3 kg)   Height: 5' 6.75\" (1.695 m)     Body mass index is 53.34 kg/m². Constitutional: She is oriented to person, place, and time. She appears well-developed and well-nourished. No distress. HEENT:   Head: Normocephalic and atraumatic.    Right Ear: Tympanic membrane, external ear and results as as indicator for immunity, and to continue with current precautions regardless of the results. Patient demonstrates understanding and would like to have testing.   - Covid-19, Antibody, Total; Future    5. Morbid obesity ([address]  Increased. Counseled on lifestyle modifications including diet and exercise. 6. Chronic seasonal allergic rhinitis  Stable. Continue current regimen. 7. Chronic pain of right knee  Stable. PT. Weight loss. If pain persists after the physical therapy discussed she should get an x-ray and if it shows arthritis come in for an injection or we can refer to orthopedic surgery. Discussed medications with patient, who voiced understanding of their use and indications. All questions answered. Return in about 6 months (around 1/30/2021) for Chronic conditions.

## 2020-08-21 ENCOUNTER — PATIENT MESSAGE (OUTPATIENT)
Dept: FAMILY MEDICINE CLINIC | Age: 55
End: 2020-08-21

## 2020-08-21 NOTE — TELEPHONE ENCOUNTER
From: Nathan Dobbins  To: Raúl Mattson MD  Sent: 8/21/2020 9:01 AM EDT  Subject: Non-Urgent Medical Question    My knee is not improving. I've tried aqua therapy and it got worse instead of better. So therapy suggested I see an Orthopedist. I would like a referral for Dr Mango Zapata and Dr Rocio Lorenzo. I have yet to get an X-ray. But I feel those Drs would take one there anyway. Please call me . Thanks.  Cam Mcgraw

## 2020-08-24 NOTE — TELEPHONE ENCOUNTER
Referral signed. Patient notes in her message that she would like a call back.   Please call her back and let her know that she can schedule with orthopedist.

## 2020-12-18 ENCOUNTER — TELEPHONE (OUTPATIENT)
Dept: FAMILY MEDICINE CLINIC | Age: 55
End: 2020-12-18

## 2020-12-18 RX ORDER — NYSTATIN 100000 U/G
OINTMENT TOPICAL
Qty: 30 G | Refills: 0 | Status: SHIPPED | OUTPATIENT
Start: 2020-12-18

## 2020-12-18 NOTE — TELEPHONE ENCOUNTER
----- Message from Manuel Galo sent at 12/18/2020  1:07 PM EST -----  Subject: Message to Provider    QUESTIONS  Information for Provider? Pt was previously on Nystatin a couple of years   ago and is interested in a prescription to have it as needed. Please call   to advise  ---------------------------------------------------------------------------  --------------  CALL BACK INFO  What is the best way for the office to contact you? OK to leave message on   voicemail  Preferred Call Back Phone Number? [phone]  ---------------------------------------------------------------------------  --------------  SCRIPT ANSWERS  Relationship to Patient?  Self

## 2021-02-04 ENCOUNTER — OFFICE VISIT (OUTPATIENT)
Dept: FAMILY MEDICINE CLINIC | Age: 56
End: 2021-02-04
Payer: COMMERCIAL

## 2021-02-04 VITALS
OXYGEN SATURATION: 98 % | HEART RATE: 85 BPM | TEMPERATURE: 97.3 F | SYSTOLIC BLOOD PRESSURE: 120 MMHG | BODY MASS INDEX: 54.12 KG/M2 | DIASTOLIC BLOOD PRESSURE: 80 MMHG | WEIGHT: 293 LBS

## 2021-02-04 DIAGNOSIS — G89.29 CHRONIC PAIN OF RIGHT KNEE: ICD-10-CM

## 2021-02-04 DIAGNOSIS — E66.01 MORBID OBESITY (HCC): ICD-10-CM

## 2021-02-04 DIAGNOSIS — M25.561 CHRONIC PAIN OF RIGHT KNEE: ICD-10-CM

## 2021-02-04 DIAGNOSIS — I10 ESSENTIAL HYPERTENSION: Primary | ICD-10-CM

## 2021-02-04 DIAGNOSIS — I10 ESSENTIAL HYPERTENSION: ICD-10-CM

## 2021-02-04 DIAGNOSIS — F41.9 ANXIETY: ICD-10-CM

## 2021-02-04 DIAGNOSIS — R73.9 ELEVATED BLOOD SUGAR: ICD-10-CM

## 2021-02-04 LAB
ANION GAP SERPL CALCULATED.3IONS-SCNC: 9 MMOL/L (ref 3–16)
BUN BLDV-MCNC: 13 MG/DL (ref 7–20)
CALCIUM SERPL-MCNC: 9.4 MG/DL (ref 8.3–10.6)
CHLORIDE BLD-SCNC: 99 MMOL/L (ref 99–110)
CO2: 30 MMOL/L (ref 21–32)
CREAT SERPL-MCNC: 0.7 MG/DL (ref 0.6–1.1)
GFR AFRICAN AMERICAN: >60
GFR NON-AFRICAN AMERICAN: >60
GLUCOSE BLD-MCNC: 102 MG/DL (ref 70–99)
POTASSIUM SERPL-SCNC: 4.2 MMOL/L (ref 3.5–5.1)
SODIUM BLD-SCNC: 138 MMOL/L (ref 136–145)

## 2021-02-04 PROCEDURE — G8417 CALC BMI ABV UP PARAM F/U: HCPCS | Performed by: FAMILY MEDICINE

## 2021-02-04 PROCEDURE — 99214 OFFICE O/P EST MOD 30 MIN: CPT | Performed by: FAMILY MEDICINE

## 2021-02-04 PROCEDURE — G8484 FLU IMMUNIZE NO ADMIN: HCPCS | Performed by: FAMILY MEDICINE

## 2021-02-04 PROCEDURE — 3017F COLORECTAL CA SCREEN DOC REV: CPT | Performed by: FAMILY MEDICINE

## 2021-02-04 PROCEDURE — 1036F TOBACCO NON-USER: CPT | Performed by: FAMILY MEDICINE

## 2021-02-04 PROCEDURE — G8427 DOCREV CUR MEDS BY ELIG CLIN: HCPCS | Performed by: FAMILY MEDICINE

## 2021-02-04 RX ORDER — CHOLECALCIFEROL (VITAMIN D3) 1250 MCG
CAPSULE ORAL
COMMUNITY

## 2021-02-04 SDOH — ECONOMIC STABILITY: TRANSPORTATION INSECURITY
IN THE PAST 12 MONTHS, HAS LACK OF TRANSPORTATION KEPT YOU FROM MEETINGS, WORK, OR FROM GETTING THINGS NEEDED FOR DAILY LIVING?: NO

## 2021-02-04 SDOH — ECONOMIC STABILITY: FOOD INSECURITY: WITHIN THE PAST 12 MONTHS, YOU WORRIED THAT YOUR FOOD WOULD RUN OUT BEFORE YOU GOT MONEY TO BUY MORE.: NEVER TRUE

## 2021-02-04 ASSESSMENT — PATIENT HEALTH QUESTIONNAIRE - PHQ9
2. FEELING DOWN, DEPRESSED OR HOPELESS: 1
SUM OF ALL RESPONSES TO PHQ QUESTIONS 1-9: 1
SUM OF ALL RESPONSES TO PHQ9 QUESTIONS 1 & 2: 1

## 2021-02-04 NOTE — PROGRESS NOTES
Adeline Fuentes   YOB: 1965    Date of Visit:  2/4/2021    Allergies   Allergen Reactions    Balsam Rash    Balsam [place]-Castor Oil [Amberderm] Rash     Contact dermatitis - typically found in hemorroid cream     Outpatient Medications Marked as Taking for the 2/4/21 encounter (Office Visit) with Ceola Kehr, MD   Medication Sig Dispense Refill    Cholecalciferol (VITAMIN D3) 1.25 MG (06292 UT) CAPS Take by mouth Daily      nystatin (MYCOSTATIN) 637752 UNIT/GM ointment Apply topically 2 times daily. 30 g 0    hydroCHLOROthiazide (MICROZIDE) 12.5 MG capsule TAKE ONE CAPSULE BY MOUTH EVERY MORNING 90 capsule 3    losartan (COZAAR) 100 MG tablet TAKE ONE TABLET BY MOUTH DAILY 90 tablet 3    sertraline (ZOLOFT) 50 MG tablet TAKE ONE TABLET BY MOUTH DAILY 90 tablet 3    montelukast (SINGULAIR) 10 MG tablet TAKE ONE TABLET BY MOUTH DAILY (Patient taking differently: as needed ) 82 tablet 0    azelastine HCl 0.15 % SOLN 2 sprays by Nasal route as needed (allergies) 100 mL 2    aspirin 81 MG chewable tablet Take 81 mg by mouth           Vitals:    02/04/21 0946   BP: 120/80   Site: Right Upper Arm   Position: Sitting   Cuff Size: Medium Adult   Pulse: 85   Temp: 97.3 °F (36.3 °C)   SpO2: 98%   Weight: (!) 343 lb (155.6 kg)     Body mass index is 54.12 kg/m². Wt Readings from Last 3 Encounters:   02/04/21 (!) 343 lb (155.6 kg)   07/30/20 (!) 338 lb (153.3 kg)   01/03/20 (!) 333 lb (151 kg)     BP Readings from Last 3 Encounters:   02/04/21 120/80   07/30/20 119/85   01/03/20 120/80        Chief Complaint   Patient presents with    Follow-up    Knee Pain      chronic knee on right side        HPI    Knee pain:  R side. S/p PT. Seeing orthopaedic surgery. S/p injections. Her symptoms have improved but still has some issues.  Was told she will likely need surgery eventually.     Morbid obesity:  Not currently working on it but ready to start  She denies any snoring. She denies any apnea. Doesn't drink ETOH.      HTN:   Doing well still on current regimen. No symptoms concerning for end organ damage.  1/2018 started on meds for HTN. Started on lisinopril- stopped given concern for metallic taste. Tried amlodipine and still had the metallic taste. Then tried metoprolol and still had the taste and wanted to go off of it.     IBS:  Hx of IBS years ago with diarrhea and constipation.  Taking a fiber pill which hasn't helped. S/p colonoscopy 2016.      Seasonal allergies:  On allegra and singulair and stable. Generally stops in June. Using Azelastine nasal spray prn. Hx of seeing allergist once 1/2018.     Tinnitus:  Bilateral.  s/p eval by ENT - nl hearing test.      Anxiety:  stable zoloft.  Has anxiety from time to time but overall stable.      Victorino's Disease: Dx 2005.  Uses topical cream which doesn't help much. Sees dermatology.      Lower L back pain:  Has had issues off and on for 10 years.  + sciatica - has pain down the leg sometimes.  Has not treated it.  Around Thanksgiving it was bothering her doing the dishes or standing. If someone were to massage it it would be very painful. Currently not really causing her issues though.      Plantar fasciitis: has had this on both feet for 2 years.  Hx of seeing podiatry and had shots and orthotics- these didn't work. The exercises have helped however.  This has resolved now and she doesn't have pain with this anymore.     Hx of L ankle fracture:  16 years ago broke her ankle jumping on a trampoline.         HM: seeing GYN.     Social History     Socioeconomic History    Marital status:      Spouse name: Not on file    Number of children: Not on file    Years of education: Not on file    Highest education level: Not on file   Occupational History    Not on file   Social Needs    Financial resource strain: Not hard at all  Food insecurity     Worry: Never true     Inability: Never true    Transportation needs     Medical: No     Non-medical: No   Tobacco Use    Smoking status: Never Smoker    Smokeless tobacco: Never Used   Substance and Sexual Activity    Alcohol use: No    Drug use: No    Sexual activity: Not on file   Lifestyle    Physical activity     Days per week: Not on file     Minutes per session: Not on file    Stress: Not on file   Relationships    Social connections     Talks on phone: Not on file     Gets together: Not on file     Attends Yarsani service: Not on file     Active member of club or organization: Not on file     Attends meetings of clubs or organizations: Not on file     Relationship status: Not on file    Intimate partner violence     Fear of current or ex partner: Not on file     Emotionally abused: Not on file     Physically abused: Not on file     Forced sexual activity: Not on file   Other Topics Concern    Not on file   Social History Narrative    Not on file         Review of Systems  As documented in the HPI. Currently no complaints of CP or SUKHI. Physical Exam  Constitutional:       General: She is not in acute distress. Appearance: She is well-developed. HENT:      Head: Normocephalic and atraumatic. Cardiovascular:      Rate and Rhythm: Normal rate and regular rhythm. Heart sounds: Normal heart sounds. No murmur. Pulmonary:      Effort: Pulmonary effort is normal. No respiratory distress. Breath sounds: Normal breath sounds. Skin:     General: Skin is warm and dry. Neurological:      Mental Status: She is alert. Psychiatric:         Behavior: Behavior normal.           Assessment/Plan     1. Essential hypertension  Stable. Continue current regimen. - Basic Metabolic Panel; Future  - External Referral To Bariatrics    2. Morbid obesity ([address]  Increased. Counseled on lifestyle modifications including diet and exercise. She would like to go to the bariatrics center at [facility].   - External Referral To Bariatrics    3. Chronic pain of right knee  Improved. Seeing ortho. Will start working on weight loss. 4. Elevated blood sugar  Stable. Continue current regimen. - Hemoglobin A1C; Future    5. Anxiety  Stable. Continue current regimen. Discussed medications with patient, who voiced understanding of their use and indications. All questions answered. Return in about 6 months (around 8/4/2021) for Physical.         Documentation was done using voice recognition dragon software. Efforts were made to ensure accuracy; however, inadvertent, unintentional computerized transcription errors may be present. --Marsha Flores MD on 2/4/2021    An electronic signature was used to authenticate this note.

## 2021-02-05 LAB
ESTIMATED AVERAGE GLUCOSE: 116.9 MG/DL
HBA1C MFR BLD: 5.7 %

## 2021-03-23 DIAGNOSIS — J30.2 SEASONAL ALLERGIES: ICD-10-CM

## 2021-03-23 RX ORDER — MONTELUKAST SODIUM 10 MG/1
TABLET ORAL
Qty: 30 TABLET | Refills: 0 | Status: SHIPPED | OUTPATIENT
Start: 2021-03-23 | End: 2021-08-26 | Stop reason: SDUPTHER

## 2021-08-12 ENCOUNTER — OFFICE VISIT (OUTPATIENT)
Dept: FAMILY MEDICINE CLINIC | Age: 56
End: 2021-08-12
Payer: COMMERCIAL

## 2021-08-12 VITALS
BODY MASS INDEX: 45.99 KG/M2 | OXYGEN SATURATION: 98 % | SYSTOLIC BLOOD PRESSURE: 132 MMHG | HEART RATE: 85 BPM | DIASTOLIC BLOOD PRESSURE: 86 MMHG | WEIGHT: 293 LBS | HEIGHT: 67 IN

## 2021-08-12 DIAGNOSIS — F41.9 ANXIETY: ICD-10-CM

## 2021-08-12 DIAGNOSIS — M79.662 PAIN OF LEFT CALF: ICD-10-CM

## 2021-08-12 DIAGNOSIS — G89.29 CHRONIC PAIN OF BOTH KNEES: ICD-10-CM

## 2021-08-12 DIAGNOSIS — Z00.00 HEALTHCARE MAINTENANCE: Primary | ICD-10-CM

## 2021-08-12 DIAGNOSIS — I10 ESSENTIAL HYPERTENSION: ICD-10-CM

## 2021-08-12 DIAGNOSIS — M25.562 CHRONIC PAIN OF BOTH KNEES: ICD-10-CM

## 2021-08-12 DIAGNOSIS — E66.01 MORBID OBESITY (HCC): ICD-10-CM

## 2021-08-12 DIAGNOSIS — M25.561 CHRONIC PAIN OF BOTH KNEES: ICD-10-CM

## 2021-08-12 PROCEDURE — 99396 PREV VISIT EST AGE 40-64: CPT | Performed by: FAMILY MEDICINE

## 2021-08-12 NOTE — PROGRESS NOTES
History and Physical      Chief Complaint:   Bree Serna is a 61 y.o. female who presents for complete physical examination. Chief Complaint   Patient presents with    Annual Exam       HPI:     L leg pain:  Right below her calf muscle. S/p ortho evaluation. Referred to PT. Started after walking a lot after [place] and [place]. The ortho discuss doing eval for blood clot. No change in pain since onset. Present and stable for 2-3 weeks. No redness. No swelling. Only bothers her after walking.      Knee pain:  Bilateral.  S/p PT. Seeing orthopaedic surgery. Reports she needs knee replacements but needs to loose weight first.  S/p injections. Her symptoms have improved but still has some issues.     Morbid obesity:  Doing weight watchers. Has appointment with [facility] weight loss to look into surgery next week to help kick start her at the rec of ortho. She denies any snoring. She denies any apnea. Doesn't drink ETOH.      HTN:   Doing well still on current regimen. No symptoms concerning for end organ damage.  1/2018 started on meds for HTN. Started on lisinopril- stopped given concern for metallic taste. Tried amlodipine and still had the metallic taste. Then tried metoprolol and still had the taste and wanted to go off of it.     IBS:  Hx of IBS years ago with diarrhea and constipation.  Taking a fiber pill which hasn't helped. S/p colonoscopy 2016.      Seasonal allergies:  On allegra and singulair and stable. Generally stops in June. Using Azelastine nasal spray prn. Hx of seeing allergist once 1/2018.     Tinnitus:  Bilateral.  s/p eval by ENT - nl hearing test.      Anxiety:  stable on zoloft.       Victorino's Disease: Dx 2005.  Uses topical cream which doesn't help much. Sees dermatology.      Plantar fasciitis: has had this on both feet for 2 years.  Hx of seeing podiatry and had shots and orthotics- these didn't work. The exercises have helped however.  This has resolved now and she doesn't have pain with this anymore.     Hx of L ankle fracture:  16 years ago broke her ankle jumping on a trampoline.         HM: seeing GYN.     SH: daughter Jerri Vee is going to establish as a patient in our office.    Vitals:    08/12/21 1006   BP: 132/86   Site: Right Upper Arm   Position: Sitting   Cuff Size: Large Adult   Pulse: 85   SpO2: 98%   Weight: (!) 333 lb (151 kg)   Height: 5' 6.75\" (1.695 m)       Wt Readings from Last 3 Encounters:   08/12/21 (!) 333 lb (151 kg)   02/04/21 (!) 343 lb (155.6 kg)   07/30/20 (!) 338 lb (153.3 kg)     BP Readings from Last 3 Encounters:   08/12/21 132/86   02/04/21 120/80   07/30/20 119/85       Patient Active Problem List   Diagnosis    Mesa's disease    Hypertension    Tinnitus of both ears    Metallic taste    Dizziness    Tingling    Anxiety    Morbid obesity ([address]    Chronic seasonal allergic rhinitis    Vague bodily discomfort    Other irritable bowel syndrome    Chronic pain of both knees    Plantar fasciitis, bilateral    Left sided sciatica       Preventive Care:  Health Maintenance   Topic Date Due    Cervical cancer screen  Never done    Flu vaccine (1) 09/01/2021    Breast cancer screen  09/16/2021    A1C test (Diabetic or Prediabetic)  02/04/2022    Potassium monitoring  02/04/2022    Creatinine monitoring  02/04/2022    Lipid screen  07/30/2025    Colon cancer screen colonoscopy  09/20/2026    DTaP/Tdap/Td vaccine (2 - Td or Tdap) 05/29/2028    Shingles Vaccine  Completed    COVID-19 Vaccine  Completed    Hepatitis C screen  Completed    HIV screen  Completed    Hepatitis A vaccine  Aged Out    Hepatitis B vaccine  Aged Out    Hib vaccine  Aged Out    Meningococcal (ACWY) vaccine  Aged Out    Pneumococcal 0-64 years Vaccine  Aged [employer] History   Administered Date(s) Administered    COVID-19, Pfizer, PF, 30mcg/0.3mL 03/13/2021, 04/06/2021    Hepatitis B 01/01/1997    Influenza Vaccine, unspecified formulation 10/01/2015, 09/01/2017, 09/15/2018    Influenza Virus Vaccine 2019    Pneumococcal Polysaccharide (Fddnmlllb25) 2017    Tdap (Boostrix, Adacel) 2018    Zoster Recombinant (Shingrix) 2019, 2019       Allergies   Allergen Reactions    Balsam Rash    Balsam [place]-Castor Oil [Amberderm] Rash     Contact dermatitis - typically found in hemorroid cream     Outpatient Medications Marked as Taking for the 21 encounter (Office Visit) with Yoandy Sethi MD   Medication Sig Dispense Refill    montelukast (SINGULAIR) 10 MG tablet TAKE ONE TABLET BY MOUTH DAILY 30 tablet 0    Cholecalciferol (VITAMIN D3) 1.25 MG (65040 UT) CAPS Take by mouth Daily      nystatin (MYCOSTATIN) 609049 UNIT/GM ointment Apply topically 2 times daily.  30 g 0    hydroCHLOROthiazide (MICROZIDE) 12.5 MG capsule TAKE ONE CAPSULE BY MOUTH EVERY MORNING 90 capsule 3    losartan (COZAAR) 100 MG tablet TAKE ONE TABLET BY MOUTH DAILY 90 tablet 3    sertraline (ZOLOFT) 50 MG tablet TAKE ONE TABLET BY MOUTH DAILY 90 tablet 3    azelastine HCl 0.15 % SOLN 2 sprays by Nasal route as needed (allergies) 100 mL 2    aspirin 81 MG chewable tablet Take 81 mg by mouth         Past Medical History:   Diagnosis Date    Depression     Whitsett's disease     Hypertension 2018    Infertility, female [address] [place] Pre-eclampsia      Past Surgical History:   Procedure Laterality Date    ANKLE SURGERY Left 2004    ANUS SURGERY  1996    fissure     SECTION      1998    CHOLECYSTECTOMY      LAPAROSCOPY      MYOMECTOMY       Family History   Problem Relation Age of Onset    Cancer Maternal Aunt     Cancer Paternal Aunt     Stroke Maternal Grandmother     Cancer Paternal Grandmother     Diabetes Neg Hx     Heart Disease Neg Hx      Social History     Socioeconomic History    Marital status:      Spouse name: Not on file    Number of children: Not on file    Years of education: Not on file    Highest education level: Not on file   Occupational History    Not on file   Tobacco Use    Smoking status: Never Smoker    Smokeless tobacco: Never Used   Vaping Use    Vaping Use: Never used   Substance and Sexual Activity    Alcohol use: No    Drug use: No    Sexual activity: Not on file   Other Topics Concern    Not on file   Social History Narrative    Not on file     Social Determinants of Health     Financial Resource Strain: Low Risk     Difficulty of Paying Living Expenses: Not hard at all   Food Insecurity: No Food Insecurity    Worried About [address] in the Last Year: Never true    Gin of Food in the Last Year: Never true   Transportation Needs: No Transportation Needs    Lack of Transportation (Medical): No    Lack of Transportation (Non-Medical): No   Physical Activity:     Days of Exercise per Week:     Minutes of Exercise per Session:    Stress:     Feeling of Stress :    Social Connections:     Frequency of Communication with Friends and Family:     Frequency of Social Gatherings with Friends and Family:     Attends Episcopal Services:     Active Member of Clubs or Organizations:     Attends Club or Organization Meetings:     Marital Status:    Intimate Partner Violence:     Fear of Current or Ex-Partner:     Emotionally Abused:     Physically Abused:     Sexually Abused:        Review of Systems:  A comprehensive review of systems was negative except for what was noted in the HPI. Physical Exam:   Vitals:    08/12/21 1006   BP: 132/86   Site: Right Upper Arm   Position: Sitting   Cuff Size: Large Adult   Pulse: 85   SpO2: 98%   Weight: (!) 333 lb (151 kg)   Height: 5' 6.75\" (1.695 m)     Body mass index is 52.55 kg/m². Constitutional: She is oriented to person, place, and time. She appears well-developed and well-nourished. No distress. HEENT:   Head: Normocephalic and atraumatic.    Right Ear: Tympanic membrane, external ear and ear canal normal.   Left Ear: Tympanic membrane, external ear and ear canal normal.   Nose: Nose normal.   Mouth/Throat: There is no cervical adenopathy. Eyes: Conjunctivae and extraocular motions are normal. Pupils are equal, round, and reactive to light. Neck: Neck supple. No mass and no thyromegaly present. Cardiovascular: Normal rate, regular rhythm, normal heart sounds. Exam reveals no gallop and no friction rub. No murmur heard. Pulmonary/Chest: Effort normal and breath sounds normal. No respiratory distress. She has no wheezes, rhonchi or rales. Abdominal: Soft, non-tender. Bowel sounds are normal. She exhibits no palpable organomegaly or mass. Musculoskeletal: Normal range of motion. She exhibits no edema. Left LLE neurovascularly intact  No redness, no swelling, no TTP. Neurological: She is alert and oriented. No cranial nerve deficit. Coordination normal.   Skin: Skin is warm and dry. There is no rash or erythema. Psychiatric: She has a normal mood and affect. Her speech is normal and behavior is normal. Judgment, cognition and memory are normal.       Assessment/Plan     1. Healthcare maintenance  Annual physical exam done today. Counseled on preventative care and a healthy lifestlye. - Basic Metabolic Panel; Future  - Lipid Panel; Future  - Hemoglobin A1C; Future    2. Chronic pain of both knees  Persistent. Seeing ortho. 3. Pain of left calf  Seems musculoskeletal. Suspicion for clot low but will do a ddimer. LLE US if +   - D-DIMER, QUANTITATIVE; Future    4. Essential hypertension  Stable. Continue current regimen. - Basic Metabolic Panel; Future    5. Morbid obesity ([address]  Improved. Continue weight watchers. Planning to look into surgery. May f/u to discuss medications if does not go that route. 6. Anxiety  Stable. Continue current regimen. Discussed medications with patient, who voiced understanding of their use and indications. All questions answered.     Return in about 6 months (around 2/12/2022) for Chronic conditions. Documentation was done using voice recognition dragon software. Efforts were made to ensure accuracy; however, inadvertent, unintentional computerized transcription errors may be present. --Mi Carl MD on 8/12/2021    An electronic signature was used to authenticate this note.

## 2021-08-26 ENCOUNTER — PATIENT MESSAGE (OUTPATIENT)
Dept: FAMILY MEDICINE CLINIC | Age: 56
End: 2021-08-26

## 2021-08-26 DIAGNOSIS — J30.2 SEASONAL ALLERGIES: ICD-10-CM

## 2021-08-26 RX ORDER — MONTELUKAST SODIUM 10 MG/1
TABLET ORAL
Qty: 90 TABLET | Refills: 3 | Status: SHIPPED | OUTPATIENT
Start: 2021-08-26 | End: 2022-09-06

## 2021-08-26 NOTE — TELEPHONE ENCOUNTER
From: Jneae Kumar  To: Shantel Lehman MD  Sent: 8/26/2021 12:36 PM EDT  Subject: Prescription Question    I forgot at my last visit to ask for an Rx for Singulair. Please place an script for me at the [facility] in [place], [address] Any questions you can reach me at [phone].  [place] - [place] Acosta

## 2021-08-26 NOTE — TELEPHONE ENCOUNTER
Medication:   Requested Prescriptions     Pending Prescriptions Disp Refills    montelukast (SINGULAIR) 10 MG tablet 30 tablet 0     Sig: TAKE ONE TABLET BY MOUTH DAILY        Last Filled:  3/23/21 #30, 0 RF     Patient Phone Number: [phone] (home)     Last appt: 8/12/2021   Next appt: 2/14/2022    Last OARRS: No flowsheet data found.

## 2021-09-06 DIAGNOSIS — F41.9 ANXIETY: ICD-10-CM

## 2021-09-06 DIAGNOSIS — I10 ESSENTIAL HYPERTENSION: ICD-10-CM

## 2021-09-08 RX ORDER — HYDROCHLOROTHIAZIDE 12.5 MG/1
CAPSULE, GELATIN COATED ORAL
Qty: 90 CAPSULE | Refills: 3 | Status: SHIPPED | OUTPATIENT
Start: 2021-09-08 | End: 2022-03-17

## 2021-09-08 RX ORDER — LOSARTAN POTASSIUM 100 MG/1
TABLET ORAL
Qty: 90 TABLET | Refills: 3 | Status: SHIPPED | OUTPATIENT
Start: 2021-09-08 | End: 2022-10-31 | Stop reason: SDUPTHER

## 2022-01-20 ENCOUNTER — TELEPHONE (OUTPATIENT)
Dept: FAMILY MEDICINE CLINIC | Age: 57
End: 2022-01-20

## 2022-01-20 NOTE — TELEPHONE ENCOUNTER
Patient stated that BP is 140-150 /  90s. Has a slight headache.  Requesting to have it checked/ advice

## 2022-01-20 NOTE — TELEPHONE ENCOUNTER
Physical with PCP in august. She was on hydrochlorothiazide 12.5mg daily and losartan (cozaar) 100mg daily. Are these the meds she's still taking for blood pressure? Did she start any new meds or supplements recently? She can take another pill of hydrochlorothiazide 12.5mg this morning ASAP to see if that helps. The sooner she takes it, the less she'll keep peeing later on today.

## 2022-02-28 ENCOUNTER — OFFICE VISIT (OUTPATIENT)
Dept: FAMILY MEDICINE CLINIC | Age: 57
End: 2022-02-28
Payer: COMMERCIAL

## 2022-02-28 VITALS
HEART RATE: 83 BPM | HEIGHT: 66 IN | BODY MASS INDEX: 47.09 KG/M2 | DIASTOLIC BLOOD PRESSURE: 93 MMHG | RESPIRATION RATE: 16 BRPM | WEIGHT: 293 LBS | TEMPERATURE: 96.6 F | SYSTOLIC BLOOD PRESSURE: 144 MMHG | OXYGEN SATURATION: 97 %

## 2022-02-28 DIAGNOSIS — I10 PRIMARY HYPERTENSION: ICD-10-CM

## 2022-02-28 DIAGNOSIS — R73.9 ELEVATED BLOOD SUGAR: ICD-10-CM

## 2022-02-28 DIAGNOSIS — F41.9 ANXIETY: ICD-10-CM

## 2022-02-28 DIAGNOSIS — E66.01 MORBID OBESITY (HCC): ICD-10-CM

## 2022-02-28 DIAGNOSIS — I10 PRIMARY HYPERTENSION: Primary | ICD-10-CM

## 2022-02-28 PROCEDURE — G8417 CALC BMI ABV UP PARAM F/U: HCPCS | Performed by: FAMILY MEDICINE

## 2022-02-28 PROCEDURE — 99214 OFFICE O/P EST MOD 30 MIN: CPT | Performed by: FAMILY MEDICINE

## 2022-02-28 PROCEDURE — 1036F TOBACCO NON-USER: CPT | Performed by: FAMILY MEDICINE

## 2022-02-28 PROCEDURE — G8427 DOCREV CUR MEDS BY ELIG CLIN: HCPCS | Performed by: FAMILY MEDICINE

## 2022-02-28 PROCEDURE — 3017F COLORECTAL CA SCREEN DOC REV: CPT | Performed by: FAMILY MEDICINE

## 2022-02-28 PROCEDURE — G8482 FLU IMMUNIZE ORDER/ADMIN: HCPCS | Performed by: FAMILY MEDICINE

## 2022-02-28 RX ORDER — ALBUTEROL SULFATE 90 UG/1
1 AEROSOL, METERED RESPIRATORY (INHALATION) EVERY 6 HOURS PRN
COMMUNITY
Start: 2021-12-10

## 2022-02-28 SDOH — ECONOMIC STABILITY: FOOD INSECURITY: WITHIN THE PAST 12 MONTHS, THE FOOD YOU BOUGHT JUST DIDN'T LAST AND YOU DIDN'T HAVE MONEY TO GET MORE.: NEVER TRUE

## 2022-02-28 SDOH — ECONOMIC STABILITY: FOOD INSECURITY: WITHIN THE PAST 12 MONTHS, YOU WORRIED THAT YOUR FOOD WOULD RUN OUT BEFORE YOU GOT MONEY TO BUY MORE.: NEVER TRUE

## 2022-02-28 ASSESSMENT — PATIENT HEALTH QUESTIONNAIRE - PHQ9
2. FEELING DOWN, DEPRESSED OR HOPELESS: 1
1. LITTLE INTEREST OR PLEASURE IN DOING THINGS: 0
SUM OF ALL RESPONSES TO PHQ QUESTIONS 1-9: 1
SUM OF ALL RESPONSES TO PHQ9 QUESTIONS 1 & 2: 1
SUM OF ALL RESPONSES TO PHQ QUESTIONS 1-9: 1

## 2022-02-28 ASSESSMENT — SOCIAL DETERMINANTS OF HEALTH (SDOH): HOW HARD IS IT FOR YOU TO PAY FOR THE VERY BASICS LIKE FOOD, HOUSING, MEDICAL CARE, AND HEATING?: NOT HARD AT ALL

## 2022-02-28 NOTE — PROGRESS NOTES
Mack Ni   YOB: 1965    Date of Visit:  2/28/2022    Allergies   Allergen Reactions    Balsam Rash    Balsam Peru-Castor Oil [Balsam Peru-Castor Oil] Rash     Contact dermatitis - typically found in hemorroid cream     Outpatient Medications Marked as Taking for the 2/28/22 encounter (Office Visit) with Yue Fung MD   Medication Sig Dispense Refill    albuterol sulfate  (90 Base) MCG/ACT inhaler Inhale 1 each into the lungs every 6 hours as needed for Wheezing or Shortness of Breath       sertraline (ZOLOFT) 50 MG tablet TAKE ONE TABLET BY MOUTH DAILY 90 tablet 3    hydroCHLOROthiazide (MICROZIDE) 12.5 MG capsule TAKE ONE CAPSULE BY MOUTH EVERY MORNING 90 capsule 3    losartan (COZAAR) 100 MG tablet TAKE ONE TABLET BY MOUTH DAILY 90 tablet 3    montelukast (SINGULAIR) 10 MG tablet TAKE ONE TABLET BY MOUTH DAILY 90 tablet 3    Cholecalciferol (VITAMIN D3) 1.25 MG (48549 UT) CAPS Take by mouth Daily      nystatin (MYCOSTATIN) 087377 UNIT/GM ointment Apply topically 2 times daily. 30 g 0    azelastine HCl 0.15 % SOLN 2 sprays by Nasal route as needed (allergies) 100 mL 2    aspirin 81 MG chewable tablet Take 81 mg by mouth           Vitals:    02/28/22 1049 02/28/22 1052   BP: (!) 151/77 (!) 144/93   Site: Left Lower Arm Right Lower Arm   Position: Sitting Sitting   Cuff Size: Small Adult Small Adult   Pulse: 83    Resp: 16    Temp: 96.6 °F (35.9 °C)    TempSrc: Temporal    SpO2: 97%    Weight: (!) 344 lb 3.2 oz (156.1 kg)    Height: 5' 6\" (1.676 m)      Body mass index is 55.56 kg/m². Wt Readings from Last 3 Encounters:   02/28/22 (!) 344 lb 3.2 oz (156.1 kg)   08/12/21 (!) 333 lb (151 kg)   02/04/21 (!) 343 lb (155.6 kg)     BP Readings from Last 3 Encounters:   02/28/22 (!) 144/93   08/12/21 132/86   02/04/21 120/80        Chief Complaint   Patient presents with    Hypertension     F/U    Anxiety     F/U       HPI         Knee pain:  Bilateral.  S/p PT.  Hx of Social Determinants of Health     Financial Resource Strain: Low Risk     Difficulty of Paying Living Expenses: Not hard at all   Food Insecurity: No Food Insecurity    Worried About Running Out of Food in the Last Year: Never true    Gin of Food in the Last Year: Never true   Transportation Needs:     Lack of Transportation (Medical): Not on file    Lack of Transportation (Non-Medical): Not on file   Physical Activity:     Days of Exercise per Week: Not on file    Minutes of Exercise per Session: Not on file   Stress:     Feeling of Stress : Not on file   Social Connections:     Frequency of Communication with Friends and Family: Not on file    Frequency of Social Gatherings with Friends and Family: Not on file    Attends Yazidism Services: Not on file    Active Member of [address] [place] [address] or Organizations: Not on file    Attends Club or Organization Meetings: Not on file    Marital Status: Not on file   Intimate Partner Violence:     Fear of Current or Ex-Partner: Not on file    Emotionally Abused: Not on file    Physically Abused: Not on file    Sexually Abused: Not on file   Housing Stability:     Unable to Pay for Housing in the Last Year: Not on file    Number of [place] in the Last Year: Not on file    Unstable Housing in the Last Year: Not on file         Review of Systems  As documented in the HPI. Currently no complaints of CP or SUKHI. Physical Exam  Constitutional:       General: She is not in acute distress. Appearance: She is well-developed. HENT:      Head: Normocephalic and atraumatic. Cardiovascular:      Rate and Rhythm: Normal rate and regular rhythm. Heart sounds: Normal heart sounds. No murmur heard. Pulmonary:      Effort: Pulmonary effort is normal. No respiratory distress. Breath sounds: Normal breath sounds. Skin:     General: Skin is warm and dry. Neurological:      Mental Status: She is alert.    Psychiatric:         Behavior: Behavior normal.           Assessment/Plan     1. Primary hypertension  Running high today. Discussed increasing HCTZ vs monitoring at home and increasing if high at home. Pt elected the ladder. She will monitor and let me know if running > 140/90. I recommend a diet high in plant matter (fruits, vegetables), fish and chicken, and whole grains. Please try to limit processed foods, sugars, soda/sweetened beverages (that are not diet), and red or fatty meats. Regular aerobic exercise is also beneficial.    - Basic Metabolic Panel; Future  - [facility] [place]    2. Anxiety  Stable. Continue current regimen. 3. Morbid obesity ([address]  Increased. Insurance did not cover surgery. Discussed meds vs referral. Referral for non surgical program.   - [facility]    4. Elevated blood sugar  - Hemoglobin A1C; Future      Discussed medications with patient, who voiced understanding of their use and indications. All questions answered. Return in about 6 months (around 8/28/2022) for Physical.         Documentation was done using voice recognition dragon software. Efforts were made to ensure accuracy; however, inadvertent, unintentional computerized transcription errors may be present. --Nikita Arriaga MD on 2/28/2022    An electronic signature was used to authenticate this note.

## 2022-03-01 LAB
ANION GAP SERPL CALCULATED.3IONS-SCNC: 9 MMOL/L (ref 3–16)
BUN BLDV-MCNC: 17 MG/DL (ref 7–20)
CALCIUM SERPL-MCNC: 9 MG/DL (ref 8.3–10.6)
CHLORIDE BLD-SCNC: 102 MMOL/L (ref 99–110)
CO2: 30 MMOL/L (ref 21–32)
CREAT SERPL-MCNC: 0.7 MG/DL (ref 0.6–1.1)
ESTIMATED AVERAGE GLUCOSE: 122.6 MG/DL
GFR AFRICAN AMERICAN: >60
GFR NON-AFRICAN AMERICAN: >60
GLUCOSE BLD-MCNC: 100 MG/DL (ref 70–99)
HBA1C MFR BLD: 5.9 %
POTASSIUM SERPL-SCNC: 4.1 MMOL/L (ref 3.5–5.1)
SODIUM BLD-SCNC: 141 MMOL/L (ref 136–145)

## 2022-03-17 DIAGNOSIS — I10 ESSENTIAL HYPERTENSION: ICD-10-CM

## 2022-03-17 RX ORDER — HYDROCHLOROTHIAZIDE 25 MG/1
25 TABLET ORAL EVERY MORNING
Qty: 90 TABLET | Refills: 0 | Status: SHIPPED | OUTPATIENT
Start: 2022-03-17 | End: 2022-06-08

## 2022-06-08 RX ORDER — HYDROCHLOROTHIAZIDE 25 MG/1
TABLET ORAL
Qty: 28 TABLET | Refills: 2 | Status: SHIPPED | OUTPATIENT
Start: 2022-06-08 | End: 2022-09-06

## 2022-06-08 NOTE — TELEPHONE ENCOUNTER
Medication:   Requested Prescriptions     Pending Prescriptions Disp Refills    hydroCHLOROthiazide (HYDRODIURIL) 25 MG tablet [Pharmacy Med Name: hydroCHLOROthiazide 25 MG TABLET] 28 tablet      Sig: TAKE ONE TABLET BY MOUTH EVERY MORNING     Last Filled:  3.17.22 #90 refills 0    Last appt: 2/28/2022   Next appt: 8/29/2022    Last OARRS: No flowsheet data found.

## 2022-07-13 ENCOUNTER — NURSE ONLY (OUTPATIENT)
Dept: FAMILY MEDICINE CLINIC | Age: 57
End: 2022-07-13

## 2022-07-13 ENCOUNTER — TELEPHONE (OUTPATIENT)
Dept: FAMILY MEDICINE CLINIC | Age: 57
End: 2022-07-13

## 2022-07-13 DIAGNOSIS — R05.9 COUGH: Primary | ICD-10-CM

## 2022-07-13 LAB
Lab: NORMAL
QC PASS/FAIL: NORMAL
SARS-COV-2 RDRP RESP QL NAA+PROBE: NEGATIVE

## 2022-07-13 PROCEDURE — 87635 SARS-COV-2 COVID-19 AMP PRB: CPT | Performed by: FAMILY MEDICINE

## 2022-07-13 NOTE — TELEPHONE ENCOUNTER
Patient  tested positive for COVID yesterday, patient has had sx 3 days, cough cold, congestion, no fever, no SOB, coming in at 9 am for COVID test. Need VV, please advise?       ** patient tested negative in office rapid, sent for culture, please advise

## 2022-07-14 ENCOUNTER — TELEMEDICINE (OUTPATIENT)
Dept: FAMILY MEDICINE CLINIC | Age: 57
End: 2022-07-14
Payer: COMMERCIAL

## 2022-07-14 DIAGNOSIS — Z20.822 SUSPECTED COVID-19 VIRUS INFECTION: Primary | ICD-10-CM

## 2022-07-14 LAB — SARS-COV-2: NOT DETECTED

## 2022-07-14 PROCEDURE — G8427 DOCREV CUR MEDS BY ELIG CLIN: HCPCS | Performed by: FAMILY MEDICINE

## 2022-07-14 PROCEDURE — 3017F COLORECTAL CA SCREEN DOC REV: CPT | Performed by: FAMILY MEDICINE

## 2022-07-14 PROCEDURE — 99213 OFFICE O/P EST LOW 20 MIN: CPT | Performed by: FAMILY MEDICINE

## 2022-07-14 RX ORDER — SEMAGLUTIDE 1.34 MG/ML
0.25 INJECTION, SOLUTION SUBCUTANEOUS WEEKLY
COMMUNITY
Start: 2022-06-06 | End: 2022-07-14

## 2022-07-14 NOTE — PROGRESS NOTES
2022    TELEHEALTH EVALUATION -- Audio/Visual (During XMHGT-09 public health emergency)    HPI:    Mariela Maria (:  1965) has requested an audio/video evaluation for the following concern(s):    C/o 4 day hx sore throat, headache, subjective fever, cough which has been worsening, nasal congestion, runny nose.  has had similar symptoms that began 6 days ago, tested positive for covid. Pt took home test which was negative, rapid in the office yesterday was negative as well. Waiting on PCR results. Review of Systems:  Gen:  Denies chills. No weight loss  CV:  Denies chest pain or tightness, palpitations. Pulm:  Denies shortness of breath  Abd:  Denies abdominal pain, change in bowel habits. Prior to Visit Medications    Medication Sig Taking? Authorizing Provider   hydroCHLOROthiazide (HYDRODIURIL) 25 MG tablet TAKE ONE TABLET BY MOUTH EVERY MORNING Yes Consuelo Tucker MD   albuterol sulfate  (90 Base) MCG/ACT inhaler Inhale 1 each into the lungs every 6 hours as needed for Wheezing or Shortness of Breath  Yes Historical Provider, MD   sertraline (ZOLOFT) 50 MG tablet TAKE ONE TABLET BY MOUTH DAILY Yes Consuelo Tucker MD   losartan (COZAAR) 100 MG tablet TAKE ONE TABLET BY MOUTH DAILY Yes Consuelo Tucker MD   montelukast (SINGULAIR) 10 MG tablet TAKE ONE TABLET BY MOUTH DAILY Yes Consuelo Tucker MD   Cholecalciferol (VITAMIN D3) 1.25 MG (62001 UT) CAPS Take by mouth Daily Yes Historical Provider, MD   nystatin (MYCOSTATIN) 817619 UNIT/GM ointment Apply topically 2 times daily.  Yes Nette Andrade MD   azelastine HCl 0.15 % SOLN 2 sprays by Nasal route as needed (allergies) Yes Gilbert Le MD   aspirin 81 MG chewable tablet Take 81 mg by mouth Yes Historical Provider, MD       Past Medical History:   Diagnosis Date    Depression     [place]'s disease     Hypertension 2018    Infertility, female 11    Pre-eclampsia        Past Surgical History: Psychiatric: Normal Affect. No Hallucinations            ASSESSMENT/PLAN:  1. Suspected COVID-19 virus infection  Pt has had close exposure and has symptoms. .  Will treat with paxlovid for likely false negative test  - nirmatrelvir/ritonavir (PAXLOVID) 20 x 150 MG & 10 x 100MG; Take 3 tablets (two 150 mg nirmatrelvir and one 100 mg ritonavir tablets) by mouth every 12 hours for 5 days. Dispense: 30 tablet; Refill: 0      No follow-ups on file. Leora Faustin, was evaluated through a synchronous (real-time) audio-video encounter. The patient (or guardian if applicable) is aware that this is a billable service, which includes applicable co-pays. This Virtual Visit was conducted with patient's (and/or legal guardian's) consent. The visit was conducted pursuant to the emergency declaration under the [address] [place] [address], [address] [place] [address] authority and the [employer] General Act. Patient identification was verified, and a caregiver was present when appropriate. The patient was located in a state where the provider was licensed to provide care. Total time spent on this encounter: This encounter was not billed based on time. Services were provided through a video synchronous discussion virtually to substitute for in-person clinic visit. Patient was located in their home. Provider was located in the office. --Franchesca Meehan MD on 7/14/2022 at 10:34 AM    An electronic signature was used to authenticate this note. Nrobert Orozco

## 2022-09-04 DIAGNOSIS — J30.2 SEASONAL ALLERGIES: ICD-10-CM

## 2022-09-06 RX ORDER — MONTELUKAST SODIUM 10 MG/1
TABLET ORAL
Qty: 30 TABLET | Refills: 2 | Status: SHIPPED | OUTPATIENT
Start: 2022-09-06

## 2022-09-06 RX ORDER — HYDROCHLOROTHIAZIDE 25 MG/1
TABLET ORAL
Qty: 30 TABLET | Refills: 0 | Status: SHIPPED | OUTPATIENT
Start: 2022-09-06 | End: 2022-10-03

## 2022-09-06 NOTE — TELEPHONE ENCOUNTER
Last OV 7/14/2022   Next OV 9/4/2022    Requested Prescriptions     Pending Prescriptions Disp Refills    montelukast (SINGULAIR) 10 MG tablet [Pharmacy Med Name: MONTELUKAST SOD 10 MG TABLET] 30 tablet 2     Sig: TAKE ONE TABLET BY MOUTH DAILY      Last fills 8/2021  pended

## 2022-10-03 ENCOUNTER — OFFICE VISIT (OUTPATIENT)
Dept: FAMILY MEDICINE CLINIC | Age: 57
End: 2022-10-03
Payer: COMMERCIAL

## 2022-10-03 VITALS
TEMPERATURE: 98.1 F | DIASTOLIC BLOOD PRESSURE: 90 MMHG | BODY MASS INDEX: 45.99 KG/M2 | WEIGHT: 293 LBS | OXYGEN SATURATION: 97 % | SYSTOLIC BLOOD PRESSURE: 142 MMHG | HEART RATE: 89 BPM | HEIGHT: 67 IN

## 2022-10-03 DIAGNOSIS — G89.29 CHRONIC PAIN OF BOTH KNEES: ICD-10-CM

## 2022-10-03 DIAGNOSIS — Z00.00 ENCOUNTER FOR WELL ADULT EXAM WITHOUT ABNORMAL FINDINGS: ICD-10-CM

## 2022-10-03 DIAGNOSIS — M25.561 CHRONIC PAIN OF BOTH KNEES: ICD-10-CM

## 2022-10-03 DIAGNOSIS — Z00.00 ENCOUNTER FOR WELL ADULT EXAM WITHOUT ABNORMAL FINDINGS: Primary | ICD-10-CM

## 2022-10-03 DIAGNOSIS — I10 PRIMARY HYPERTENSION: ICD-10-CM

## 2022-10-03 DIAGNOSIS — M25.562 CHRONIC PAIN OF BOTH KNEES: ICD-10-CM

## 2022-10-03 DIAGNOSIS — F41.9 ANXIETY: ICD-10-CM

## 2022-10-03 DIAGNOSIS — E66.01 MORBID OBESITY (HCC): ICD-10-CM

## 2022-10-03 LAB
ANION GAP SERPL CALCULATED.3IONS-SCNC: 11 MMOL/L (ref 3–16)
BUN BLDV-MCNC: 18 MG/DL (ref 7–20)
CALCIUM SERPL-MCNC: 9.3 MG/DL (ref 8.3–10.6)
CHLORIDE BLD-SCNC: 100 MMOL/L (ref 99–110)
CHOLESTEROL, TOTAL: 146 MG/DL (ref 0–199)
CO2: 28 MMOL/L (ref 21–32)
CREAT SERPL-MCNC: 0.7 MG/DL (ref 0.6–1.1)
GFR AFRICAN AMERICAN: >60
GFR NON-AFRICAN AMERICAN: >60
GLUCOSE BLD-MCNC: 99 MG/DL (ref 70–99)
HBV SURFACE AB TITR SER: 280.4 MIU/ML
HDLC SERPL-MCNC: 60 MG/DL (ref 40–60)
HEPATITIS B SURFACE ANTIGEN INTERPRETATION: NORMAL
LDL CHOLESTEROL CALCULATED: 62 MG/DL
POTASSIUM SERPL-SCNC: 3.9 MMOL/L (ref 3.5–5.1)
SODIUM BLD-SCNC: 139 MMOL/L (ref 136–145)
TRIGL SERPL-MCNC: 120 MG/DL (ref 0–150)
VLDLC SERPL CALC-MCNC: 24 MG/DL

## 2022-10-03 PROCEDURE — 90674 CCIIV4 VAC NO PRSV 0.5 ML IM: CPT | Performed by: FAMILY MEDICINE

## 2022-10-03 PROCEDURE — 90471 IMMUNIZATION ADMIN: CPT | Performed by: FAMILY MEDICINE

## 2022-10-03 PROCEDURE — G8482 FLU IMMUNIZE ORDER/ADMIN: HCPCS | Performed by: FAMILY MEDICINE

## 2022-10-03 PROCEDURE — 99396 PREV VISIT EST AGE 40-64: CPT | Performed by: FAMILY MEDICINE

## 2022-10-03 RX ORDER — HYDROCHLOROTHIAZIDE 25 MG/1
TABLET ORAL
Qty: 90 TABLET | Refills: 3 | Status: SHIPPED | OUTPATIENT
Start: 2022-10-03

## 2022-10-03 RX ORDER — TIRZEPATIDE 2.5 MG/.5ML
INJECTION, SOLUTION SUBCUTANEOUS
COMMUNITY

## 2022-10-03 SDOH — ECONOMIC STABILITY: HOUSING INSECURITY: IN THE LAST 12 MONTHS, HOW MANY PLACES HAVE YOU LIVED?: 1

## 2022-10-03 SDOH — ECONOMIC STABILITY: HOUSING INSECURITY
IN THE LAST 12 MONTHS, WAS THERE A TIME WHEN YOU DID NOT HAVE A STEADY PLACE TO SLEEP OR SLEPT IN A SHELTER (INCLUDING NOW)?: NO

## 2022-10-03 SDOH — ECONOMIC STABILITY: FOOD INSECURITY: WITHIN THE PAST 12 MONTHS, YOU WORRIED THAT YOUR FOOD WOULD RUN OUT BEFORE YOU GOT MONEY TO BUY MORE.: NEVER TRUE

## 2022-10-03 SDOH — SOCIAL STABILITY: SOCIAL NETWORK
IN A TYPICAL WEEK, HOW MANY TIMES DO YOU TALK ON THE PHONE WITH FAMILY, FRIENDS, OR NEIGHBORS?: MORE THAN THREE TIMES A WEEK

## 2022-10-03 SDOH — SOCIAL STABILITY: SOCIAL NETWORK: HOW OFTEN DO YOU GET TOGETHER WITH FRIENDS OR RELATIVES?: ONCE A WEEK

## 2022-10-03 SDOH — SOCIAL STABILITY: SOCIAL NETWORK: HOW OFTEN DO YOU ATTENT MEETINGS OF THE CLUB OR ORGANIZATION YOU BELONG TO?: NEVER

## 2022-10-03 SDOH — ECONOMIC STABILITY: TRANSPORTATION INSECURITY
IN THE PAST 12 MONTHS, HAS THE LACK OF TRANSPORTATION KEPT YOU FROM MEDICAL APPOINTMENTS OR FROM GETTING MEDICATIONS?: NO

## 2022-10-03 SDOH — SOCIAL STABILITY: SOCIAL NETWORK: ARE YOU MARRIED, WIDOWED, DIVORCED, SEPARATED, NEVER MARRIED, OR LIVING WITH A PARTNER?: MARRIED

## 2022-10-03 SDOH — HEALTH STABILITY: MENTAL HEALTH
STRESS IS WHEN SOMEONE FEELS TENSE, NERVOUS, ANXIOUS, OR CAN'T SLEEP AT NIGHT BECAUSE THEIR MIND IS TROUBLED. HOW STRESSED ARE YOU?: NOT AT ALL

## 2022-10-03 SDOH — HEALTH STABILITY: MENTAL HEALTH: HOW OFTEN DO YOU HAVE A DRINK CONTAINING ALCOHOL?: NEVER

## 2022-10-03 SDOH — HEALTH STABILITY: PHYSICAL HEALTH: ON AVERAGE, HOW MANY DAYS PER WEEK DO YOU ENGAGE IN MODERATE TO STRENUOUS EXERCISE (LIKE A BRISK WALK)?: 0 DAYS

## 2022-10-03 SDOH — HEALTH STABILITY: PHYSICAL HEALTH: ON AVERAGE, HOW MANY MINUTES DO YOU ENGAGE IN EXERCISE AT THIS LEVEL?: 0 MIN

## 2022-10-03 SDOH — ECONOMIC STABILITY: INCOME INSECURITY: IN THE LAST 12 MONTHS, WAS THERE A TIME WHEN YOU WERE NOT ABLE TO PAY THE MORTGAGE OR RENT ON TIME?: NO

## 2022-10-03 SDOH — ECONOMIC STABILITY: FOOD INSECURITY: WITHIN THE PAST 12 MONTHS, THE FOOD YOU BOUGHT JUST DIDN'T LAST AND YOU DIDN'T HAVE MONEY TO GET MORE.: NEVER TRUE

## 2022-10-03 SDOH — ECONOMIC STABILITY: INCOME INSECURITY: HOW HARD IS IT FOR YOU TO PAY FOR THE VERY BASICS LIKE FOOD, HOUSING, MEDICAL CARE, AND HEATING?: NOT HARD AT ALL

## 2022-10-03 SDOH — SOCIAL STABILITY: SOCIAL NETWORK: HOW OFTEN DO YOU ATTEND CHURCH OR RELIGIOUS SERVICES?: NEVER

## 2022-10-03 SDOH — SOCIAL STABILITY: SOCIAL NETWORK
DO YOU BELONG TO ANY CLUBS OR ORGANIZATIONS SUCH AS CHURCH GROUPS UNIONS, FRATERNAL OR ATHLETIC GROUPS, OR SCHOOL GROUPS?: NO

## 2022-10-03 NOTE — PROGRESS NOTES
History and Physical      Chief Complaint:   Daniela Kang is a 61 y.o. female who presents for complete physical examination. Chief Complaint   Patient presents with    Annual Exam       HPI:      Morbid obesity/metabolic syndrome:  Taking tirzepatide weekly and seeing weight loss specialist with [facility] - has a coupon. Reports her insurance does not cover weight loss surgery. She denies any snoring. She denies any apnea. Doesn't drink ETOH. Knee pain:  Bilateral.  S/p PT. Hx of seeing orthopaedic surgery. Reports she needs knee replacements but needs to loose weight first - want her to be at a BMI of 35. S/p injections. HTN:   Doing well still on current regimen. No symptoms concerning for end organ damage. BP was 118/89 last week. 1/2018 started on meds for HTN. Started on lisinopril- stopped given concern for metallic taste. Tried amlodipine and still had the metallic taste. Then tried metoprolol and still had the taste and wanted to go off of it. IBS:  Hx of IBS years ago with diarrhea and constipation. S/p colonoscopy 2016. Seasonal allergies: On allegra and singulair and stable. Generally stops in June. Using Azelastine nasal spray prn. Hx of seeing allergist once 1/2018. Tinnitus:  Bilateral.  s/p eval by ENT - nl hearing test.      Anxiety:  stable on zoloft. [place]'s Disease: Dx 2005. Uses topical cream which doesn't help much. Sees dermatology. Hx Plantar fasciitis: has had this on both feet. Hx of seeing podiatry and had shots and orthotics- these didn't work. This has resolved now and she doesn't have pain with this anymore. Hx of L ankle fracture:  16 years ago broke her ankle jumping on a trampoline. HM: seeing GYN. SH: 10/22: daughter Nicole Casey is a patient here. Lives with  and daughter who graduated with her masters in Social work 2022. Has 2 daughters.        Vitals:    10/03/22 0938   BP: (!) 142/90   Site: Right Upper Arm   Position: Sitting   Cuff Size: Large Adult   Pulse: 89   Temp: 98.1 °F (36.7 °C)   TempSrc: Oral   SpO2: 97%   Weight: (!) 324 lb 12.8 oz (147.3 kg)   Height: 5' 7\" (1.702 m)       Wt Readings from Last 3 Encounters:   10/03/22 (!) 324 lb 12.8 oz (147.3 kg)   02/28/22 (!) 344 lb 3.2 oz (156.1 kg)   08/12/21 (!) 333 lb (151 kg)     BP Readings from Last 3 Encounters:   10/03/22 (!) 142/90   02/28/22 (!) 144/93   08/12/21 132/86       Patient Active Problem List   Diagnosis    [place]'s disease    Hypertension    Tinnitus of both ears    Metallic taste    Dizziness    Tingling    Anxiety    Morbid obesity (HCC)    Chronic seasonal allergic rhinitis    Vague bodily discomfort    Other irritable bowel syndrome    Chronic pain of both knees    Plantar fasciitis, bilateral    Left sided sciatica       Preventive Care:  Health Maintenance   Topic Date Due    Cervical cancer screen  Never done    Hepatitis B vaccine (2 of 4 - 4-dose series) 01/29/1997    COVID-19 Vaccine (4 - Booster for Pfizer series) 03/19/2022    Flu vaccine (1) 08/01/2022    A1C test (Diabetic or Prediabetic)  02/28/2023    Depression Screen  02/28/2023    Breast cancer screen  12/03/2023    Colorectal Cancer Screen  09/20/2026    Lipids  05/26/2027    DTaP/Tdap/Td vaccine (2 - Td or Tdap) 05/29/2028    Shingles vaccine  Completed    Hepatitis C screen  Completed    HIV screen  Completed    Hepatitis A vaccine  Aged Out    Hib vaccine  Aged Out    Meningococcal (ACWY) vaccine  Aged Out    Pneumococcal 0-64 years Vaccine  Aged [employer] History   Administered Date(s) Administered    COVID-19, PFIZER PURPLE top, DILUTE for use, (age 15 y+), 30mcg/0.3mL 03/13/2021, 04/06/2021, 11/19/2021    Hepatitis B 01/01/1997    Influenza Vaccine, unspecified formulation 10/01/2015, 09/01/2017, 09/15/2018    Influenza Virus Vaccine 09/15/2018, 08/24/2019, 09/22/2021    Influenza, FLUBLOK, (age 25 y+), PF, 0.5mL 11/18/2019, 09/21/2020, 09/21/2021    Pneumococcal Polysaccharide ([id number]) 05/29/2017    Tdap (Boostrix, Adacel) 05/29/2018    Zoster Recombinant (Shingrix) 03/12/2019, 06/03/2019       Allergies   Allergen Reactions    Balsam Rash    Balsam Peru-Castor Oil [Balsam Peru-Castor Oil] Rash     Contact dermatitis - typically found in hemorroid cream     Outpatient Medications Marked as Taking for the 10/3/22 encounter (Office Visit) with Lenore Hardy MD   Medication Sig Dispense Refill    Tirzepatide (MOUNJARO) 2.5 MG/0.5ML SOPN SC injection Inject into the skin      montelukast (SINGULAIR) 10 MG tablet TAKE ONE TABLET BY MOUTH DAILY 30 tablet 2    hydroCHLOROthiazide (HYDRODIURIL) 25 MG tablet TAKE ONE TABLET BY MOUTH EVERY MORNING 30 tablet 0    albuterol sulfate  (90 Base) MCG/ACT inhaler Inhale 1 each into the lungs every 6 hours as needed for Wheezing or Shortness of Breath       sertraline (ZOLOFT) 50 MG tablet TAKE ONE TABLET BY MOUTH DAILY 90 tablet 3    losartan (COZAAR) 100 MG tablet TAKE ONE TABLET BY MOUTH DAILY 90 tablet 3    Cholecalciferol (VITAMIN D3) 1.25 MG (14349 UT) CAPS Take by mouth Daily      nystatin (MYCOSTATIN) 081646 UNIT/GM ointment Apply topically 2 times daily.  30 g 0    azelastine HCl 0.15 % SOLN 2 sprays by Nasal route as needed (allergies) 100 mL 2    aspirin 81 MG chewable tablet Take 81 mg by mouth         Past Medical History:   Diagnosis Date    Depression 2005    Victorino's disease     Hypertension 2018    Infertility, female 11    Pre-eclampsia 1997     Past Surgical History:   Procedure Laterality Date    ANKLE SURGERY Left 2004    [address] [facility]     Family History   Problem Relation Age of Onset    Cancer Maternal Aunt     Cancer Paternal Aunt     Stroke Maternal Grandmother     Cancer Paternal Grandmother     Diabetes Neg Hx     Heart Disease Neg Hx      Social History     Socioeconomic History Marital status:      Spouse name: Not on file    Number of children: Not on file    Years of education: Not on file    Highest education level: Not on file   Occupational History    Not on file   Tobacco Use    Smoking status: Never    Smokeless tobacco: Never   Vaping Use    Vaping Use: Never used   Substance and Sexual Activity    Alcohol use: No    Drug use: No    Sexual activity: Not on file   Other Topics Concern    Not on file   Social History Narrative    Not on file     Social Determinants of Health     Financial Resource Strain: Low Risk     Difficulty of Paying Living Expenses: Not hard at all   Food Insecurity: No Food Insecurity    Worried About Running Out of Food in the Last Year: Never true    [address] in the Last Year: Never true   Transportation Needs: No Transportation Needs    Lack of Transportation (Medical): No    Lack of Transportation (Non-Medical): No   Physical Activity: Inactive    Days of Exercise per Week: 0 days    Minutes of Exercise per Session: 0 min   Stress: No Stress Concern Present    Feeling of Stress : Not at all   Social Connections: Moderately Isolated    Frequency of Communication with Friends and Family: More than three times a week    Frequency of Social Gatherings with Friends and Family: Once a week    Attends Evangelical Services: Never    Active Member of Clubs or Organizations: No    Attends Club or Organization Meetings: Never    Marital Status:    Intimate Partner Violence: Not on file   Housing Stability: [address] to Pay for Housing in the Last Year: No    Number of [place] in the Last Year: 1    Unstable Housing in the Last Year: No       Review of Systems:  A comprehensive review of systems was negative except for what was noted in the HPI.      Physical Exam:   Vitals:    10/03/22 0938   BP: (!) 142/90   Site: Right Upper Arm   Position: Sitting   Cuff Size: Large Adult   Pulse: 89   Temp: 98.1 °F (36.7 °C)   TempSrc: Oral SpO2: 97%   Weight: (!) 324 lb 12.8 oz (147.3 kg)   Height: 5' 7\" (1.702 m)     Body mass index is 50.87 kg/m². Constitutional: She is oriented to person, place, and time. She appears well-developed and well-nourished. No distress. HEENT:   Head: Normocephalic and atraumatic. Right Ear: Tympanic membrane, external ear and ear canal normal.   Left Ear: Tympanic membrane, external ear and ear canal normal.   Nose: Nose normal.   Eyes: Conjunctivae and extraocular motions are normal. Pupils are equal, round, and reactive to light. Neck: Neck supple. No mass and no thyromegaly present. Cardiovascular: Normal rate, regular rhythm, normal heart sounds. Exam reveals no gallop and no friction rub. No murmur heard. Pulmonary/Chest: Effort normal and breath sounds normal. No respiratory distress. She has no wheezes, rhonchi or rales. Abdominal: Soft, non-tender. Bowel sounds are normal. She exhibits no palpable organomegaly or mass. Musculoskeletal: Normal range of motion. She exhibits no edema. Neurological: She is alert and oriented. No cranial nerve deficit. Coordination normal.   Skin: Skin is warm and dry. There is no rash or erythema. Psychiatric: She has a normal mood and affect. Her speech is normal and behavior is normal. Judgment, cognition and memory are normal.       Assessment/Plan     1. Encounter for well adult exam without abnormal findings  Annual physical exam done today. Counseled on preventative care and a healthy lifestlye. - Basic Metabolic Panel; Future  - Lipid Panel; Future  - Hemoglobin A1C; Future  - Hepatitis B Surface Antigen; Future  - Hepatitis B Surface Antibody; Future    2. Primary hypertension  Stable. Continue current regimen. Working on diet and exercise. A little high today. 3. Morbid obesity ([address]  Improved. Continue with weight loss efforts. 4. Anxiety  Stable. Continue current regimen. 5. Chronic pain of both knees  Persistent.  Would like to get knees replaced once BMI is at 35. Discussed medications with patient, who voiced understanding of their use and indications. All questions answered. Return in about 6 months (around 4/3/2023) for Chronic conditions. Documentation was done using voice recognition dragon software. Efforts were made to ensure accuracy; however, inadvertent, unintentional computerized transcription errors may be present. --Sabrina Jones MD on 10/3/2022    An electronic signature was used to authenticate this note.

## 2022-10-03 NOTE — TELEPHONE ENCOUNTER
Medication:   Requested Prescriptions     Pending Prescriptions Disp Refills    hydroCHLOROthiazide (HYDRODIURIL) 25 MG tablet [Pharmacy Med Name: hydroCHLOROthiazide 25 MG TABLET] 28 tablet      Sig: TAKE ONE TABLET BY MOUTH EVERY MORNING        Last Filled:  9/6/2022 30 tabs 0 refills     Patient Phone Number: [phone] (home)     Last appt: 7/14/2022   Next appt: 10/3/2022    Last OARRS: No flowsheet data found.

## 2022-10-03 NOTE — PATIENT INSTRUCTIONS
day, and wear a seat belt in the car. Where can you learn more? Go to [email or web address] and sign in to your [place] account. Enter P072 in the [facility] box to learn more about \"Well Visit, Ages 25 to 72: Care Instructions. \"     If you do not have an account, please click on the \"Sign Up Now\" link. Current as of: March 9, 2022               Content Version: 13.4  © 4932-0213 Healthwise, Incorporated. Care instructions adapted under license by [facility] ([facility]). If you have questions about a medical condition or this instruction, always ask your healthcare professional. [address] any warranty or liability for your use of this information.

## 2022-10-04 LAB
ESTIMATED AVERAGE GLUCOSE: 114 MG/DL
HBA1C MFR BLD: 5.6 %

## 2022-10-31 DIAGNOSIS — I10 ESSENTIAL HYPERTENSION: ICD-10-CM

## 2022-10-31 DIAGNOSIS — F41.9 ANXIETY: ICD-10-CM

## 2022-10-31 RX ORDER — LOSARTAN POTASSIUM 100 MG/1
TABLET ORAL
Qty: 90 TABLET | Refills: 3 | Status: SHIPPED | OUTPATIENT
Start: 2022-10-31

## 2022-10-31 NOTE — TELEPHONE ENCOUNTER
Medication:   Requested Prescriptions     Pending Prescriptions Disp Refills    sertraline (ZOLOFT) 50 MG tablet 90 tablet 3    losartan (COZAAR) 100 MG tablet 90 tablet 3     Last Filled:  9.8.21 #90 refills 3                       9.8.21 #90 refills 3    Last appt: 10/3/2022   Next appt: 4/3/2023    Last OARRS: No flowsheet data found. 96

## 2022-12-22 ENCOUNTER — OFFICE VISIT (OUTPATIENT)
Dept: FAMILY MEDICINE CLINIC | Age: 57
End: 2022-12-22
Payer: COMMERCIAL

## 2022-12-22 VITALS
TEMPERATURE: 97.4 F | DIASTOLIC BLOOD PRESSURE: 76 MMHG | HEIGHT: 67 IN | BODY MASS INDEX: 45.99 KG/M2 | OXYGEN SATURATION: 98 % | HEART RATE: 74 BPM | SYSTOLIC BLOOD PRESSURE: 136 MMHG | WEIGHT: 293 LBS

## 2022-12-22 DIAGNOSIS — K59.00 CONSTIPATION, UNSPECIFIED CONSTIPATION TYPE: ICD-10-CM

## 2022-12-22 DIAGNOSIS — R31.9 HEMATURIA, UNSPECIFIED TYPE: Primary | ICD-10-CM

## 2022-12-22 LAB
BACTERIA: ABNORMAL /HPF
BILIRUBIN URINE: NEGATIVE
BILIRUBIN, POC: NORMAL
BLOOD URINE, POC: NORMAL
BLOOD, URINE: NEGATIVE
CLARITY, POC: CLEAR
CLARITY: CLEAR
COLOR, POC: YELLOW
COLOR: YELLOW
EPITHELIAL CELLS, UA: 3 /HPF (ref 0–5)
GLUCOSE URINE, POC: NORMAL
GLUCOSE URINE: NEGATIVE MG/DL
HYALINE CASTS: 1 /LPF (ref 0–8)
KETONES, POC: NORMAL
KETONES, URINE: ABNORMAL MG/DL
LEUKOCYTE EST, POC: NORMAL
LEUKOCYTE ESTERASE, URINE: ABNORMAL
MICROSCOPIC EXAMINATION: YES
NITRITE, POC: NORMAL
NITRITE, URINE: NEGATIVE
PH UA: 7 (ref 5–8)
PH, POC: 7
PROTEIN UA: ABNORMAL MG/DL
PROTEIN, POC: NORMAL
RBC UA: 1 /HPF (ref 0–4)
SPECIFIC GRAVITY UA: 1.02 (ref 1–1.03)
SPECIFIC GRAVITY, POC: 1.02
URINE TYPE: ABNORMAL
UROBILINOGEN, POC: 0.2
UROBILINOGEN, URINE: 1 E.U./DL
WBC UA: 0 /HPF (ref 0–5)

## 2022-12-22 PROCEDURE — 3078F DIAST BP <80 MM HG: CPT | Performed by: FAMILY MEDICINE

## 2022-12-22 PROCEDURE — G8482 FLU IMMUNIZE ORDER/ADMIN: HCPCS | Performed by: FAMILY MEDICINE

## 2022-12-22 PROCEDURE — G8427 DOCREV CUR MEDS BY ELIG CLIN: HCPCS | Performed by: FAMILY MEDICINE

## 2022-12-22 PROCEDURE — 1036F TOBACCO NON-USER: CPT | Performed by: FAMILY MEDICINE

## 2022-12-22 PROCEDURE — 99213 OFFICE O/P EST LOW 20 MIN: CPT | Performed by: FAMILY MEDICINE

## 2022-12-22 PROCEDURE — 3074F SYST BP LT 130 MM HG: CPT | Performed by: FAMILY MEDICINE

## 2022-12-22 PROCEDURE — 81002 URINALYSIS NONAUTO W/O SCOPE: CPT | Performed by: FAMILY MEDICINE

## 2022-12-22 PROCEDURE — G8417 CALC BMI ABV UP PARAM F/U: HCPCS | Performed by: FAMILY MEDICINE

## 2022-12-22 PROCEDURE — 3017F COLORECTAL CA SCREEN DOC REV: CPT | Performed by: FAMILY MEDICINE

## 2022-12-22 NOTE — PROGRESS NOTES
Khadijah Bozena   YOB: 1965    Date of Visit:  12/22/2022    Allergies   Allergen Reactions    Balsam Rash    Balsam Peru-Castor Oil [Balsam Peru-Castor Oil] Rash     Contact dermatitis - typically found in hemorroid cream     Outpatient Medications Marked as Taking for the 12/22/22 encounter (Office Visit) with Chin Almanzar MD   Medication Sig Dispense Refill    sertraline (ZOLOFT) 50 MG tablet TAKE ONE TABLET BY MOUTH DAILY 90 tablet 3    losartan (COZAAR) 100 MG tablet TAKE ONE TABLET BY MOUTH DAILY 90 tablet 3    hydroCHLOROthiazide (HYDRODIURIL) 25 MG tablet TAKE ONE TABLET BY MOUTH EVERY MORNING 90 tablet 3    Tirzepatide (MOUNJARO) 2.5 MG/0.5ML SOPN SC injection Inject 7.5 mg into the skin      montelukast (SINGULAIR) 10 MG tablet TAKE ONE TABLET BY MOUTH DAILY 30 tablet 2    albuterol sulfate  (90 Base) MCG/ACT inhaler Inhale 1 each into the lungs every 6 hours as needed for Wheezing or Shortness of Breath       Cholecalciferol (VITAMIN D3) 1.25 MG (79798 UT) CAPS Take by mouth Daily      nystatin (MYCOSTATIN) 097471 UNIT/GM ointment Apply topically 2 times daily. 30 g 0    azelastine HCl 0.15 % SOLN 2 sprays by Nasal route as needed (allergies) 100 mL 2    aspirin 81 MG chewable tablet Take 81 mg by mouth           Vitals:    12/22/22 1044   BP: 136/76   Site: Right Upper Arm   Pulse: 74   Temp: 97.4 °F (36.3 °C)   TempSrc: Temporal   SpO2: 98%   Weight: (!) 316 lb 9.6 oz (143.6 kg)   Height: 5' 7\" (1.702 m)     Body mass index is 49.59 kg/m². Wt Readings from Last 3 Encounters:   12/22/22 (!) 316 lb 9.6 oz (143.6 kg)   10/03/22 (!) 324 lb 12.8 oz (147.3 kg)   02/28/22 (!) 344 lb 3.2 oz (156.1 kg)     BP Readings from Last 3 Encounters:   12/22/22 136/76   10/03/22 (!) 142/90   02/28/22 (!) 144/93        Chief Complaint   Patient presents with    Hematuria     Small amount of fullness in abdomen           Assessment/Plan     1.  Hematuria, unspecified type  Unsure if vaginal or urinary. Patient declined a GYN exam today. If recurs she will try to figure that out. Will check UA and culture. Given the uncertainty of the source I would recommend rosa isela IBARRA- she plans to schedule an appointment and discuss with her gyn but if that doesn't work out she will let me know and I will order the [address]. If has recurrence and she is confident it is urinary let me know. Return precautions reviewed. Possibly etiologies discussed. 2. Constipation, unspecified constipation type  Persistent. Can take miralax more often. Discussed medications with patient, who voiced understanding of their use and indications. All questions answered. No follow-ups on file. HPI    Hematuria:  she had it once last week and all day yesterday. None today. No dysuria. No frequency. Feels a little bloating just today. Had a BM last night. Does have constipation with the monjouro- uses miralax prn but hasn't in the last few days. Looked like light pink or red on the toilet paper. Once it was also on the pantiliner otherwise only with wiping.   She is not sure it was not vaginal.        Social History     Socioeconomic History    Marital status:      Spouse name: Not on file    Number of children: Not on file    Years of education: Not on file    Highest education level: Not on file   Occupational History    Not on file   Tobacco Use    Smoking status: Never    Smokeless tobacco: Never   Vaping Use    Vaping Use: Never used   Substance and Sexual Activity    Alcohol use: No    Drug use: No    Sexual activity: Not on file   Other Topics Concern    Not on file   Social History Narrative    Not on file     Social Determinants of Health     Financial Resource Strain: Low Risk     Difficulty of Paying Living Expenses: Not hard at all   Food Insecurity: No Food Insecurity    Worried About Running Out of Food in the Last Year: Never true    [address] in the Last Year: Never true   Transportation Needs: No Transportation Needs    Lack of Transportation (Medical): No    Lack of Transportation (Non-Medical): No   Physical Activity: Inactive    Days of Exercise per Week: 0 days    Minutes of Exercise per Session: 0 min   Stress: No Stress Concern Present    Feeling of Stress : Not at all   Social Connections: Moderately Isolated    Frequency of Communication with Friends and Family: More than three times a week    Frequency of Social Gatherings with Friends and Family: Once a week    Attends Rastafari Services: Never    Active Member of Clubs or Organizations: No    Attends Club or Organization Meetings: Never    Marital Status:    Intimate Partner Violence: Not on file   Housing Stability: [address] to Pay for Housing in the Last Year: No    Number of [place] in the Last Year: 1    Unstable Housing in the Last Year: No         Review of Systems  As documented in the HPI. Currently no complaints of CP or SUKHI. Physical Exam  Constitutional:       General: She is not in acute distress. Appearance: She is well-developed. HENT:      Head: Normocephalic and atraumatic. Cardiovascular:      Rate and Rhythm: Normal rate and regular rhythm. Heart sounds: Normal heart sounds. No murmur heard. Pulmonary:      Effort: Pulmonary effort is normal. No respiratory distress. Breath sounds: Normal breath sounds. Abdominal:      General: Abdomen is flat. Bowel sounds are normal. There is no distension. Palpations: Abdomen is soft. There is no mass. Tenderness: Tenderness: slight ttp lower abdomen. There is no right CVA tenderness, left CVA tenderness, guarding or rebound. Hernia: No hernia is present. Skin:     General: Skin is warm and dry. Neurological:      Mental Status: She is alert. Psychiatric:         Behavior: Behavior normal.             Documentation was done using voice recognition dragon software.  Efforts were made to ensure accuracy; however, inadvertent, unintentional computerized transcription errors may be present. --Gabriel Mccormick MD on 12/22/2022    An electronic signature was used to authenticate this note.

## 2022-12-23 LAB — URINE CULTURE, ROUTINE: NORMAL

## 2023-01-16 DIAGNOSIS — J30.2 SEASONAL ALLERGIES: ICD-10-CM

## 2023-01-16 RX ORDER — MONTELUKAST SODIUM 10 MG/1
TABLET ORAL
Qty: 30 TABLET | Refills: 2 | Status: SHIPPED | OUTPATIENT
Start: 2023-01-16

## 2023-01-16 NOTE — TELEPHONE ENCOUNTER
Medication:   Requested Prescriptions     Pending Prescriptions Disp Refills    montelukast (SINGULAIR) 10 MG tablet 30 tablet 2     Sig: TAKE ONE TABLET BY MOUTH DAILY        Last Filled:  09/06/2022 #30 2rf    Patient Phone Number: [phone] (home)     Last appt: 12/22/2022   Next appt: 4/3/2023    Last OARRS: No flowsheet data found.

## 2023-02-15 RX ORDER — NYSTATIN 100000 U/G
OINTMENT TOPICAL
Qty: 30 G | Refills: 0 | Status: SHIPPED | OUTPATIENT
Start: 2023-02-15

## 2023-02-15 NOTE — TELEPHONE ENCOUNTER
Medication:   Requested Prescriptions     Pending Prescriptions Disp Refills    nystatin (MYCOSTATIN) 100906 UNIT/GM ointment 30 g 0     Sig: Apply topically 2 times daily.      12/18/2020 30 g 0 refills   Last Filled:      Patient Phone Number: [phone] (home)     Last appt: 12/22/2022   Next appt: 4/3/2023    Last OARRS: No flowsheet data found.

## 2023-04-03 ENCOUNTER — OFFICE VISIT (OUTPATIENT)
Dept: FAMILY MEDICINE CLINIC | Age: 58
End: 2023-04-03
Payer: COMMERCIAL

## 2023-04-03 VITALS
HEART RATE: 73 BPM | WEIGHT: 293 LBS | RESPIRATION RATE: 20 BRPM | SYSTOLIC BLOOD PRESSURE: 116 MMHG | BODY MASS INDEX: 47.86 KG/M2 | DIASTOLIC BLOOD PRESSURE: 70 MMHG | OXYGEN SATURATION: 98 % | TEMPERATURE: 97 F

## 2023-04-03 DIAGNOSIS — I10 PRIMARY HYPERTENSION: Primary | ICD-10-CM

## 2023-04-03 DIAGNOSIS — J30.2 SEASONAL ALLERGIES: ICD-10-CM

## 2023-04-03 DIAGNOSIS — E66.01 MORBID OBESITY (HCC): ICD-10-CM

## 2023-04-03 DIAGNOSIS — R73.9 ELEVATED BLOOD SUGAR: ICD-10-CM

## 2023-04-03 PROCEDURE — G8427 DOCREV CUR MEDS BY ELIG CLIN: HCPCS | Performed by: FAMILY MEDICINE

## 2023-04-03 PROCEDURE — 3078F DIAST BP <80 MM HG: CPT | Performed by: FAMILY MEDICINE

## 2023-04-03 PROCEDURE — 99214 OFFICE O/P EST MOD 30 MIN: CPT | Performed by: FAMILY MEDICINE

## 2023-04-03 PROCEDURE — G8417 CALC BMI ABV UP PARAM F/U: HCPCS | Performed by: FAMILY MEDICINE

## 2023-04-03 PROCEDURE — 3074F SYST BP LT 130 MM HG: CPT | Performed by: FAMILY MEDICINE

## 2023-04-03 PROCEDURE — 1036F TOBACCO NON-USER: CPT | Performed by: FAMILY MEDICINE

## 2023-04-03 PROCEDURE — 3017F COLORECTAL CA SCREEN DOC REV: CPT | Performed by: FAMILY MEDICINE

## 2023-04-03 RX ORDER — MONTELUKAST SODIUM 10 MG/1
TABLET ORAL
Qty: 90 TABLET | Refills: 3 | Status: SHIPPED | OUTPATIENT
Start: 2023-04-03

## 2023-04-03 ASSESSMENT — PATIENT HEALTH QUESTIONNAIRE - PHQ9
2. FEELING DOWN, DEPRESSED OR HOPELESS: 0
SUM OF ALL RESPONSES TO PHQ QUESTIONS 1-9: 0
SUM OF ALL RESPONSES TO PHQ QUESTIONS 1-9: 0
SUM OF ALL RESPONSES TO PHQ9 QUESTIONS 1 & 2: 0
1. LITTLE INTEREST OR PLEASURE IN DOING THINGS: 0
SUM OF ALL RESPONSES TO PHQ QUESTIONS 1-9: 0
SUM OF ALL RESPONSES TO PHQ QUESTIONS 1-9: 0

## 2023-04-03 NOTE — PROGRESS NOTES
Organizations: No    Attends Club or Organization Meetings: Never    Marital Status:    Intimate Partner Violence: Not on file   Housing Stability: Low Risk     Unable to Pay for Housing in the Last Year: No    Number of [place] in the Last Year: 1    Unstable Housing in the Last Year: No         Review of Systems  As documented in the HPI. Currently no complaints of CP or SUKHI. Physical Exam  Constitutional:       General: She is not in acute distress. Appearance: She is well-developed. HENT:      Head: Normocephalic and atraumatic. Cardiovascular:      Rate and Rhythm: Normal rate and regular rhythm. Heart sounds: Normal heart sounds. No murmur heard. Pulmonary:      Effort: Pulmonary effort is normal. No respiratory distress. Breath sounds: Normal breath sounds. Skin:     General: Skin is warm and dry. Neurological:      Mental Status: She is alert. Psychiatric:         Behavior: Behavior normal.             Documentation was done using voice recognition dragon software. Efforts were made to ensure accuracy; however, inadvertent, unintentional computerized transcription errors may be present. --Fam Deutsch MD on 4/3/2023    An electronic signature was used to authenticate this note.
Patient

## 2023-05-19 RX ORDER — NYSTATIN 100000 U/G
OINTMENT TOPICAL
Qty: 30 G | Refills: 0 | Status: SHIPPED | OUTPATIENT
Start: 2023-05-19

## 2023-05-19 NOTE — TELEPHONE ENCOUNTER
Medication:   Requested Prescriptions     Pending Prescriptions Disp Refills    nystatin (MYCOSTATIN) 431981 UNIT/GM ointment [Pharmacy Med Name: NYSTATIN 100,000 UNIT/GM OINT] 30 g 0     Sig: APPLY TO AFFECTED AREA(S) TWO TIMES A DAY        Last Filled:  2/15/2023 30 g 0 refills     Patient Phone Number: [phone] (home)     Last appt: 4/3/2023   Next appt: 10/5/2023    Last OARRS: No flowsheet data found.

## 2023-07-18 RX ORDER — HYDROCHLOROTHIAZIDE 25 MG/1
25 TABLET ORAL EVERY MORNING
Qty: 90 TABLET | Refills: 3 | OUTPATIENT
Start: 2023-07-18

## 2023-07-24 RX ORDER — NYSTATIN 100000 U/G
OINTMENT TOPICAL
Qty: 30 G | Refills: 0 | Status: SHIPPED | OUTPATIENT
Start: 2023-07-24

## 2023-07-24 NOTE — TELEPHONE ENCOUNTER
Medication:   Requested Prescriptions     Pending Prescriptions Disp Refills    nystatin (MYCOSTATIN) 738943 UNIT/GM ointment 30 g 0     Sig: APPLY TO AFFECTED AREA(S) TWO TIMES A DAY        Last Filled:  05/19/2023 #1 0rf    Patient Phone Number: [phone] (home)     Last appt: 4/3/2023   Next appt: 10/5/2023    Last OARRS: No flowsheet data found.

## 2023-09-28 DIAGNOSIS — I10 ESSENTIAL HYPERTENSION: ICD-10-CM

## 2023-10-02 RX ORDER — LOSARTAN POTASSIUM 100 MG/1
TABLET ORAL
Qty: 90 TABLET | Refills: 1 | Status: SHIPPED | OUTPATIENT
Start: 2023-10-02 | End: 2023-10-05 | Stop reason: SDUPTHER

## 2023-10-02 NOTE — TELEPHONE ENCOUNTER
Medication:   Requested Prescriptions     Pending Prescriptions Disp Refills    losartan (COZAAR) 100 MG tablet [Pharmacy Med Name: LOSARTAN POTASSIUM 100 MG TAB] 30 tablet      Sig: TAKE ONE TABLET BY MOUTH DAILY        Last Filled:  10/31/2022 90 tabs 3 refills     Patient Phone Number: [phone] (home)     Last appt: 4/3/2023   Next appt: 10/5/2023    Last OARRS:        No data to display

## 2023-10-04 SDOH — ECONOMIC STABILITY: FOOD INSECURITY: WITHIN THE PAST 12 MONTHS, THE FOOD YOU BOUGHT JUST DIDN'T LAST AND YOU DIDN'T HAVE MONEY TO GET MORE.: NEVER TRUE

## 2023-10-04 SDOH — ECONOMIC STABILITY: INCOME INSECURITY: HOW HARD IS IT FOR YOU TO PAY FOR THE VERY BASICS LIKE FOOD, HOUSING, MEDICAL CARE, AND HEATING?: NOT VERY HARD

## 2023-10-04 SDOH — ECONOMIC STABILITY: FOOD INSECURITY: WITHIN THE PAST 12 MONTHS, YOU WORRIED THAT YOUR FOOD WOULD RUN OUT BEFORE YOU GOT MONEY TO BUY MORE.: NEVER TRUE

## 2023-10-05 ENCOUNTER — OFFICE VISIT (OUTPATIENT)
Dept: FAMILY MEDICINE CLINIC | Age: 58
End: 2023-10-05

## 2023-10-05 VITALS
HEART RATE: 83 BPM | HEIGHT: 67 IN | TEMPERATURE: 98.1 F | WEIGHT: 293 LBS | BODY MASS INDEX: 45.99 KG/M2 | DIASTOLIC BLOOD PRESSURE: 78 MMHG | SYSTOLIC BLOOD PRESSURE: 116 MMHG | OXYGEN SATURATION: 98 %

## 2023-10-05 DIAGNOSIS — E66.01 MORBID OBESITY (HCC): ICD-10-CM

## 2023-10-05 DIAGNOSIS — I10 PRIMARY HYPERTENSION: ICD-10-CM

## 2023-10-05 DIAGNOSIS — M25.561 CHRONIC PAIN OF BOTH KNEES: ICD-10-CM

## 2023-10-05 DIAGNOSIS — G89.29 CHRONIC PAIN OF BOTH KNEES: ICD-10-CM

## 2023-10-05 DIAGNOSIS — M25.562 CHRONIC PAIN OF BOTH KNEES: ICD-10-CM

## 2023-10-05 DIAGNOSIS — Z23 NEED FOR VACCINATION: ICD-10-CM

## 2023-10-05 DIAGNOSIS — F41.9 ANXIETY: ICD-10-CM

## 2023-10-05 DIAGNOSIS — I10 ESSENTIAL HYPERTENSION: ICD-10-CM

## 2023-10-05 DIAGNOSIS — E88.810 METABOLIC SYNDROME: ICD-10-CM

## 2023-10-05 DIAGNOSIS — Z00.00 HEALTHCARE MAINTENANCE: Primary | ICD-10-CM

## 2023-10-05 RX ORDER — HYDROCHLOROTHIAZIDE 25 MG/1
25 TABLET ORAL EVERY MORNING
Qty: 90 TABLET | Refills: 3 | Status: SHIPPED | OUTPATIENT
Start: 2023-10-05

## 2023-10-05 RX ORDER — LOSARTAN POTASSIUM 100 MG/1
100 TABLET ORAL DAILY
Qty: 90 TABLET | Refills: 3 | Status: SHIPPED | OUTPATIENT
Start: 2023-10-05

## 2023-10-05 RX ORDER — TIRZEPATIDE 2.5 MG/.5ML
2.5 INJECTION, SOLUTION SUBCUTANEOUS WEEKLY
Qty: 4 ADJUSTABLE DOSE PRE-FILLED PEN SYRINGE | Refills: 2 | Status: SHIPPED | OUTPATIENT
Start: 2023-10-05

## 2023-10-05 NOTE — PROGRESS NOTES
History and Physical      Chief Complaint:   Sanjiv Granados is a 60 y.o. female who presents for complete physical examination. Chief Complaint   Patient presents with    Medication Refill    Follow-up         Assessment/Plan     Carmen Mg was seen today for medication refill and follow-up. Diagnoses and all orders for this visit:    Healthcare maintenance  -     Lipid Panel; Future  -     Basic Metabolic Panel; Future  -     Hemoglobin A1C; Future  -     Hepatitis B Surface Antigen; Future  -     Hepatitis B Surface Antibody; Future    Need for vaccination  -     Influenza, FLUCELVAX, (age 10 mo+), IM, Preservative Free, 0.5 mL    Essential hypertension  -     losartan (COZAAR) 100 MG tablet; Take 1 tablet by mouth daily    Anxiety  -     sertraline (ZOLOFT) 50 MG tablet; TAKE ONE TABLET BY MOUTH DAILY    Chronic pain of both knees    Primary hypertension    Morbid obesity (HCC)  -     Tirzepatide (MOUNJARO) 2.5 MG/0.5ML SOPN SC injection; Inject 0.5 mLs into the skin once a week    Metabolic syndrome  -     Tirzepatide (MOUNJARO) 2.5 MG/0.5ML SOPN SC injection; Inject 0.5 mLs into the skin once a week    Other orders  -     hydroCHLOROthiazide (HYDRODIURIL) 25 MG tablet; Take 1 tablet by mouth every morning        Annual physical exam done today. Counseled on preventative care and a healthy lifestlye. The patient was seen today for the issues above. They will continue their current regimen aside from the following changes:      - Obesity/metabolic syndrome: has done well with mounjaro in the past. Will restart along with a diet and exercise regimen. Continue to f/u with nutrition.   - HTN: stable on current regimen. Meds listed above refilled. -Bilateral knee pain: Persistent. Was better when she had lost weight. Will continue to work on weight loss. Discussed medications with patient, who voiced understanding of their use and indications. All questions answered.     Return in about 3 months

## 2023-10-06 DIAGNOSIS — Z00.00 HEALTHCARE MAINTENANCE: ICD-10-CM

## 2023-10-07 LAB
ANION GAP SERPL CALCULATED.3IONS-SCNC: 8 MMOL/L (ref 3–16)
BUN SERPL-MCNC: 16 MG/DL (ref 7–20)
CALCIUM SERPL-MCNC: 8.9 MG/DL (ref 8.3–10.6)
CHLORIDE SERPL-SCNC: 105 MMOL/L (ref 99–110)
CHOLEST SERPL-MCNC: 155 MG/DL (ref 0–199)
CO2 SERPL-SCNC: 32 MMOL/L (ref 21–32)
CREAT SERPL-MCNC: 0.8 MG/DL (ref 0.6–1.1)
EST. AVERAGE GLUCOSE BLD GHB EST-MCNC: 116.9 MG/DL
GFR SERPLBLD CREATININE-BSD FMLA CKD-EPI: >60 ML/MIN/{1.73_M2}
GLUCOSE SERPL-MCNC: 102 MG/DL (ref 70–99)
HBA1C MFR BLD: 5.7 %
HBV SURFACE AB SERPL IA-ACNC: 207.1 MIU/ML
HBV SURFACE AG SERPL QL IA: NORMAL
HDLC SERPL-MCNC: 66 MG/DL (ref 40–60)
LDLC SERPL CALC-MCNC: 68 MG/DL
POTASSIUM SERPL-SCNC: 4.6 MMOL/L (ref 3.5–5.1)
SODIUM SERPL-SCNC: 145 MMOL/L (ref 136–145)
TRIGL SERPL-MCNC: 107 MG/DL (ref 0–150)
VLDLC SERPL CALC-MCNC: 21 MG/DL

## 2023-10-10 ENCOUNTER — TELEPHONE (OUTPATIENT)
Dept: FAMILY MEDICINE CLINIC | Age: 58
End: 2023-10-10

## 2023-10-10 NOTE — TELEPHONE ENCOUNTER
A  scanned  request for a  Prior Authorization for medication is attached to this encounter. Please initiate  this request with the patients insurance company.  Thank  You        MOUNJARO 2.5 MG

## 2023-10-10 NOTE — TELEPHONE ENCOUNTER
SUBMITTED PA FOR Mounjaro 2.5MG/0.5ML pen-injectors VIA CMM (Key: [id number] STATUS PENDING. FOLLOW UP DONE DAILY: IF NO RESPONSE IN 3 DAYS WE WILL REFAX FOR STATUS CHECK. IF ANOTHER 3 DAYS GOES BY WITH NO RESPONSE WILL CALL INSURANCE FOR STATUS.

## 2023-10-12 NOTE — TELEPHONE ENCOUNTER
The medication was DENIED; DENIAL letter uploaded to MEDIA. If you want an APPEAL; please note in this encounter what new information you would like to APPEAL with. Once complete route back to PA POOL. If this requires a response please respond to the pool ( [phone] [phone]). Thank you please advise patient.

## 2024-01-29 ENCOUNTER — OFFICE VISIT (OUTPATIENT)
Dept: FAMILY MEDICINE CLINIC | Age: 59
End: 2024-01-29
Payer: COMMERCIAL

## 2024-01-29 VITALS
HEIGHT: 67 IN | SYSTOLIC BLOOD PRESSURE: 122 MMHG | BODY MASS INDEX: 45.99 KG/M2 | HEART RATE: 95 BPM | WEIGHT: 293 LBS | DIASTOLIC BLOOD PRESSURE: 74 MMHG | OXYGEN SATURATION: 100 %

## 2024-01-29 DIAGNOSIS — E66.01 MORBID OBESITY (HCC): ICD-10-CM

## 2024-01-29 DIAGNOSIS — F41.9 ANXIETY: ICD-10-CM

## 2024-01-29 DIAGNOSIS — I10 PRIMARY HYPERTENSION: ICD-10-CM

## 2024-01-29 DIAGNOSIS — H66.93 ACUTE BILATERAL OTITIS MEDIA: Primary | ICD-10-CM

## 2024-01-29 PROCEDURE — 3074F SYST BP LT 130 MM HG: CPT | Performed by: FAMILY MEDICINE

## 2024-01-29 PROCEDURE — 99214 OFFICE O/P EST MOD 30 MIN: CPT | Performed by: FAMILY MEDICINE

## 2024-01-29 PROCEDURE — 3078F DIAST BP <80 MM HG: CPT | Performed by: FAMILY MEDICINE

## 2024-01-29 RX ORDER — AMOXICILLIN 500 MG/1
500 CAPSULE ORAL 3 TIMES DAILY
Qty: 30 CAPSULE | Refills: 0 | Status: SHIPPED | OUTPATIENT
Start: 2024-01-29 | End: 2024-02-08

## 2024-01-29 ASSESSMENT — PATIENT HEALTH QUESTIONNAIRE - PHQ9
SUM OF ALL RESPONSES TO PHQ QUESTIONS 1-9: 0
SUM OF ALL RESPONSES TO PHQ QUESTIONS 1-9: 0
1. LITTLE INTEREST OR PLEASURE IN DOING THINGS: 0
SUM OF ALL RESPONSES TO PHQ QUESTIONS 1-9: 0
SUM OF ALL RESPONSES TO PHQ QUESTIONS 1-9: 0
SUM OF ALL RESPONSES TO PHQ9 QUESTIONS 1 & 2: 0
2. FEELING DOWN, DEPRESSED OR HOPELESS: 0

## 2024-01-29 NOTE — PROGRESS NOTES
otalgia, cough and congestion.    Diagnoses and all orders for this visit:    Acute bilateral otitis media    Anxiety    Primary hypertension    Morbid obesity (HCC)    Other orders  -     amoxicillin (AMOXIL) 500 MG capsule; Take 1 capsule by mouth 3 times daily for 10 days        The patient was seen today for the issues above.  They will continue their current regimen aside from the following changes:      -Bilateral otitis media: Will treat with amoxicillin.  Add Claritin or another antihistamine to her medication regimen.  Return precautions reviewed.  - Anxiety: Stable on Zoloft which she will continue.  -Obesity/metabolic syndrome: The patient is going to a weight loss clinic with [facility] and they are working on getting Zepound approved.  - HTN: stable on current regimen. Meds listed above refilled.         Discussed medications with patient, who voiced understanding of their use and indications. All questions answered.    Return in about 6 months (around 7/29/2024) for Chronic conditions.         HPI    The patient complains of bilateral otalgia: Her right ear is worse than the left ear.  This has been going on for several days.  Over the weekend she went to an urgent care.  She tested negative for COVID and flu.  She is given a cough medicine to take as needed.  She has been using Flonase.  No fevers.  She is hoarse with minimal cough and congestion.     Morbid obesity/metabolic syndrome:  Hx of doing well with mounjaro.  Going to a [facility] weight clinic and seeing an NP and dietician - they are working on getting zepbound 2.5mg covered. The NP would not refill it (previously was seeing a physician there who was prescribing it for her).  She has tried contrave and metformin which she did not tolerate.  She is frustrated by her weight gain and thinks going back on the mounjaro would be very beneficial for her.   Reports her insurance does not cover weight loss surgery.      Knee pain:  Bilateral.  S/p

## 2024-02-02 ENCOUNTER — PATIENT MESSAGE (OUTPATIENT)
Dept: FAMILY MEDICINE CLINIC | Age: 59
End: 2024-02-02

## 2024-02-08 DIAGNOSIS — H93.8X9 SENSATION OF FULLNESS IN EAR, UNSPECIFIED LATERALITY: Primary | ICD-10-CM

## 2024-02-14 NOTE — TELEPHONE ENCOUNTER
Left message for patient to call back in order to relay message.     Provider states that patient can be scheduled for OV if she would like to be.

## 2024-02-15 ENCOUNTER — OFFICE VISIT (OUTPATIENT)
Dept: FAMILY MEDICINE CLINIC | Age: 59
End: 2024-02-15
Payer: COMMERCIAL

## 2024-02-15 VITALS
OXYGEN SATURATION: 95 % | BODY MASS INDEX: 53.19 KG/M2 | HEART RATE: 81 BPM | SYSTOLIC BLOOD PRESSURE: 132 MMHG | TEMPERATURE: 96.8 F | WEIGHT: 293 LBS | DIASTOLIC BLOOD PRESSURE: 86 MMHG | RESPIRATION RATE: 16 BRPM

## 2024-02-15 DIAGNOSIS — E66.01 MORBID OBESITY (HCC): ICD-10-CM

## 2024-02-15 DIAGNOSIS — H93.8X3 SENSATION OF FULLNESS IN BOTH EARS: ICD-10-CM

## 2024-02-15 DIAGNOSIS — R05.2 SUBACUTE COUGH: Primary | ICD-10-CM

## 2024-02-15 PROBLEM — R20.2 TINGLING: Status: RESOLVED | Noted: 2018-04-24 | Resolved: 2024-02-15

## 2024-02-15 PROBLEM — R43.8 METALLIC TASTE: Status: RESOLVED | Noted: 2018-04-24 | Resolved: 2024-02-15

## 2024-02-15 PROBLEM — R42 DIZZINESS: Status: RESOLVED | Noted: 2018-04-24 | Resolved: 2024-02-15

## 2024-02-15 PROCEDURE — 3079F DIAST BP 80-89 MM HG: CPT | Performed by: FAMILY MEDICINE

## 2024-02-15 PROCEDURE — 99214 OFFICE O/P EST MOD 30 MIN: CPT | Performed by: FAMILY MEDICINE

## 2024-02-15 PROCEDURE — 3075F SYST BP GE 130 - 139MM HG: CPT | Performed by: FAMILY MEDICINE

## 2024-02-15 NOTE — PROGRESS NOTES
flonase.     3. Morbid obesity (HCC)  Est, uncontrolled. Planning to start zepbound soon. Did well on mounjaro and had weight loss.     Electronically signed by Marcy Correa MD on 2/15/2024 at 12:00 PM.

## 2024-03-13 RX ORDER — AMOXICILLIN 500 MG/1
CAPSULE ORAL
Qty: 30 CAPSULE | Refills: 0 | OUTPATIENT
Start: 2024-03-13

## 2024-03-13 NOTE — TELEPHONE ENCOUNTER
Medication:   Requested Prescriptions     Pending Prescriptions Disp Refills    amoxicillin (AMOXIL) 500 MG capsule [Pharmacy Med Name: AMOXICILLIN 500 MG CAPSULE] 30 capsule 0     Sig: TAKE 1 CAPSULE BY MOUTH THREE TIMES A DAY FOR 10 DAYS        Last Filled:      Patient Phone Number: [phone] (home)     Last appt: 2/15/2024   Next appt: 7/29/2024    Last OARRS:        No data to display

## 2024-03-14 NOTE — TELEPHONE ENCOUNTER
Patient did not ask for a refill on this medication, patient does not need appointment at this time

## 2024-04-04 DIAGNOSIS — J30.2 SEASONAL ALLERGIES: ICD-10-CM

## 2024-04-04 RX ORDER — MONTELUKAST SODIUM 10 MG/1
TABLET ORAL
Qty: 90 TABLET | Refills: 0 | Status: SHIPPED | OUTPATIENT
Start: 2024-04-04

## 2024-04-04 NOTE — TELEPHONE ENCOUNTER
Medication:   Requested Prescriptions     Pending Prescriptions Disp Refills    montelukast (SINGULAIR) 10 MG tablet [Pharmacy Med Name: MONTELUKAST SOD 10 MG TABLET] 30 tablet      Sig: TAKE ONE TABLET BY MOUTH DAILY        Last Filled:      Patient Phone Number: [phone] (home)     Last appt: 10/5/23  Next appt: 7/29/2024    Last OARRS:        No data to display

## 2024-07-29 ENCOUNTER — OFFICE VISIT (OUTPATIENT)
Dept: FAMILY MEDICINE CLINIC | Age: 59
End: 2024-07-29
Payer: COMMERCIAL

## 2024-07-29 VITALS
WEIGHT: 293 LBS | RESPIRATION RATE: 16 BRPM | TEMPERATURE: 97.2 F | OXYGEN SATURATION: 95 % | BODY MASS INDEX: 48.52 KG/M2 | SYSTOLIC BLOOD PRESSURE: 110 MMHG | HEART RATE: 80 BPM | DIASTOLIC BLOOD PRESSURE: 84 MMHG

## 2024-07-29 DIAGNOSIS — Z00.00 HEALTHCARE MAINTENANCE: Primary | ICD-10-CM

## 2024-07-29 DIAGNOSIS — R42 DIZZINESS: ICD-10-CM

## 2024-07-29 DIAGNOSIS — J30.2 SEASONAL ALLERGIES: ICD-10-CM

## 2024-07-29 DIAGNOSIS — I10 ESSENTIAL HYPERTENSION: ICD-10-CM

## 2024-07-29 DIAGNOSIS — F41.9 ANXIETY: ICD-10-CM

## 2024-07-29 PROCEDURE — 99396 PREV VISIT EST AGE 40-64: CPT | Performed by: FAMILY MEDICINE

## 2024-07-29 PROCEDURE — 3079F DIAST BP 80-89 MM HG: CPT | Performed by: FAMILY MEDICINE

## 2024-07-29 PROCEDURE — 99214 OFFICE O/P EST MOD 30 MIN: CPT | Performed by: FAMILY MEDICINE

## 2024-07-29 PROCEDURE — 3074F SYST BP LT 130 MM HG: CPT | Performed by: FAMILY MEDICINE

## 2024-07-29 RX ORDER — TIRZEPATIDE 5 MG/.5ML
INJECTION, SOLUTION SUBCUTANEOUS
COMMUNITY

## 2024-07-29 RX ORDER — LOSARTAN POTASSIUM 100 MG/1
100 TABLET ORAL DAILY
Qty: 90 TABLET | Refills: 3 | Status: SHIPPED | OUTPATIENT
Start: 2024-07-29

## 2024-07-29 RX ORDER — MONTELUKAST SODIUM 10 MG/1
10 TABLET ORAL DAILY
Qty: 90 TABLET | Refills: 3 | Status: SHIPPED | OUTPATIENT
Start: 2024-07-29

## 2024-07-29 RX ORDER — HYDROCHLOROTHIAZIDE 25 MG/1
25 TABLET ORAL EVERY MORNING
Qty: 90 TABLET | Refills: 3 | Status: SHIPPED | OUTPATIENT
Start: 2024-07-29

## 2024-07-29 NOTE — PROGRESS NOTES
History and Physical      Chief Complaint:   Misti Rodriguez is a 58 y.o. female who presents for complete physical examination.    Chief Complaint   Patient presents with    6 Month Follow-Up       Assessment/Plan     Misti was seen today for 6 month follow-up.    Diagnoses and all orders for this visit:    Healthcare maintenance  -     Lipid Panel; Future  -     Hemoglobin A1C; Future    Seasonal allergies  -     montelukast (SINGULAIR) 10 MG tablet; Take 1 tablet by mouth daily    Essential hypertension  -     losartan (COZAAR) 100 MG tablet; Take 1 tablet by mouth daily  -     Basic Metabolic Panel; Future    Anxiety  -     sertraline (ZOLOFT) 50 MG tablet; TAKE ONE TABLET BY MOUTH DAILY    Other orders  -     hydroCHLOROthiazide (HYDRODIURIL) 25 MG tablet; Take 1 tablet by mouth every morning      Annual physical exam done today. Counseled on preventative care and a healthy lifestlye.      - Anxiety: Stable on Zoloft which she will continue.    -Obesity/metabolic syndrome: The patient is going to a weight loss clinic with [facility] and doing well on Zepbound.    - HTN: stable on current regimen. Meds listed above refilled.     -Dizziness: Symptoms sound like orthostasis.  Discussed hydration and considering a trial of half of the dose of her hydrochlorothiazide to see if that helps.  Patient to monitor her blood pressure closely and let me know if there are any issues.  Come in sooner if there are any persistent issues.    Discussed medications with patient, who voiced understanding of their use and indications. All questions answered.    Return in about 6 months (around 1/29/2025) for Chronic conditions.         HPI    Orthostasis:  Staying well hydrated. Notices a little dizziness that resolves quickly whenever she bends over.  Has noticed this the last few weeks.      Morbid obesity/metabolic syndrome:   Going to a [facility] weight clinic and seeing an NP and dietician - She is on zepbound 5mg (not covered

## 2024-07-31 DIAGNOSIS — Z00.00 HEALTHCARE MAINTENANCE: ICD-10-CM

## 2024-07-31 DIAGNOSIS — I10 ESSENTIAL HYPERTENSION: ICD-10-CM

## 2024-07-31 LAB
ANION GAP SERPL CALCULATED.3IONS-SCNC: 11 MMOL/L (ref 3–16)
BUN SERPL-MCNC: 18 MG/DL (ref 7–20)
CALCIUM SERPL-MCNC: 9.3 MG/DL (ref 8.3–10.6)
CHLORIDE SERPL-SCNC: 102 MMOL/L (ref 99–110)
CHOLEST SERPL-MCNC: 150 MG/DL (ref 0–199)
CO2 SERPL-SCNC: 29 MMOL/L (ref 21–32)
CREAT SERPL-MCNC: 0.7 MG/DL (ref 0.6–1.1)
GFR SERPLBLD CREATININE-BSD FMLA CKD-EPI: >90 ML/MIN/{1.73_M2}
GLUCOSE SERPL-MCNC: 100 MG/DL (ref 70–99)
HDLC SERPL-MCNC: 61 MG/DL (ref 40–60)
LDLC SERPL CALC-MCNC: 66 MG/DL
POTASSIUM SERPL-SCNC: 4 MMOL/L (ref 3.5–5.1)
SODIUM SERPL-SCNC: 142 MMOL/L (ref 136–145)
TRIGL SERPL-MCNC: 115 MG/DL (ref 0–150)
VLDLC SERPL CALC-MCNC: 23 MG/DL

## 2024-08-01 LAB
EST. AVERAGE GLUCOSE BLD GHB EST-MCNC: 102.5 MG/DL
HBA1C MFR BLD: 5.2 %

## 2025-01-25 SDOH — ECONOMIC STABILITY: FOOD INSECURITY: WITHIN THE PAST 12 MONTHS, THE FOOD YOU BOUGHT JUST DIDN'T LAST AND YOU DIDN'T HAVE MONEY TO GET MORE.: NEVER TRUE

## 2025-01-25 SDOH — ECONOMIC STABILITY: FOOD INSECURITY: WITHIN THE PAST 12 MONTHS, YOU WORRIED THAT YOUR FOOD WOULD RUN OUT BEFORE YOU GOT MONEY TO BUY MORE.: NEVER TRUE

## 2025-01-25 SDOH — ECONOMIC STABILITY: INCOME INSECURITY: IN THE LAST 12 MONTHS, WAS THERE A TIME WHEN YOU WERE NOT ABLE TO PAY THE MORTGAGE OR RENT ON TIME?: NO

## 2025-01-25 ASSESSMENT — PATIENT HEALTH QUESTIONNAIRE - PHQ9
SUM OF ALL RESPONSES TO PHQ QUESTIONS 1-9: 0
2. FEELING DOWN, DEPRESSED OR HOPELESS: NOT AT ALL
SUM OF ALL RESPONSES TO PHQ9 QUESTIONS 1 & 2: 0
SUM OF ALL RESPONSES TO PHQ9 QUESTIONS 1 & 2: 0
SUM OF ALL RESPONSES TO PHQ QUESTIONS 1-9: 0
SUM OF ALL RESPONSES TO PHQ QUESTIONS 1-9: 0
1. LITTLE INTEREST OR PLEASURE IN DOING THINGS: NOT AT ALL
1. LITTLE INTEREST OR PLEASURE IN DOING THINGS: NOT AT ALL
2. FEELING DOWN, DEPRESSED OR HOPELESS: NOT AT ALL
SUM OF ALL RESPONSES TO PHQ QUESTIONS 1-9: 0

## 2025-01-27 ENCOUNTER — OFFICE VISIT (OUTPATIENT)
Dept: FAMILY MEDICINE CLINIC | Age: 60
End: 2025-01-27
Payer: COMMERCIAL

## 2025-01-27 VITALS
TEMPERATURE: 97.1 F | SYSTOLIC BLOOD PRESSURE: 114 MMHG | DIASTOLIC BLOOD PRESSURE: 80 MMHG | HEIGHT: 67 IN | WEIGHT: 293 LBS | OXYGEN SATURATION: 97 % | HEART RATE: 84 BPM | BODY MASS INDEX: 45.99 KG/M2

## 2025-01-27 DIAGNOSIS — I10 ESSENTIAL HYPERTENSION: Primary | ICD-10-CM

## 2025-01-27 DIAGNOSIS — I10 ESSENTIAL HYPERTENSION: ICD-10-CM

## 2025-01-27 DIAGNOSIS — E66.01 MORBID OBESITY: ICD-10-CM

## 2025-01-27 DIAGNOSIS — F41.9 ANXIETY: ICD-10-CM

## 2025-01-27 DIAGNOSIS — J30.2 CHRONIC SEASONAL ALLERGIC RHINITIS: ICD-10-CM

## 2025-01-27 PROCEDURE — 99214 OFFICE O/P EST MOD 30 MIN: CPT | Performed by: FAMILY MEDICINE

## 2025-01-27 PROCEDURE — 3074F SYST BP LT 130 MM HG: CPT | Performed by: FAMILY MEDICINE

## 2025-01-27 PROCEDURE — 3078F DIAST BP <80 MM HG: CPT | Performed by: FAMILY MEDICINE

## 2025-01-27 PROCEDURE — G2211 COMPLEX E/M VISIT ADD ON: HCPCS | Performed by: FAMILY MEDICINE

## 2025-01-27 RX ORDER — HYDROCHLOROTHIAZIDE 12.5 MG/1
12.5 CAPSULE ORAL EVERY MORNING
Qty: 90 CAPSULE | Refills: 1 | Status: SHIPPED | OUTPATIENT
Start: 2025-01-27

## 2025-01-27 RX ORDER — GUAIFENESIN 600 MG/1
1200 TABLET, EXTENDED RELEASE ORAL 2 TIMES DAILY
COMMUNITY

## 2025-01-27 NOTE — PROGRESS NOTES
Misti Rodriguez   YOB: 1965    Date of Visit:  1/27/2025    Allergies   Allergen Reactions    Balsam Rash    Balsam Peru-Castor Oil [Balsam [place]-Castor Oil] Rash     Contact dermatitis - typically found in hemorroid cream     Outpatient Medications Marked as Taking for the 1/27/25 encounter (Office Visit) with Viviane Balderrama MD   Medication Sig Dispense Refill    hydroCHLOROthiazide 12.5 MG capsule Take 1 capsule by mouth every morning 90 capsule 1    Tirzepatide-Weight Management (ZEPBOUND) 5 MG/0.5ML SOAJ Inject into the skin      montelukast (SINGULAIR) 10 MG tablet Take 1 tablet by mouth daily 90 tablet 3    losartan (COZAAR) 100 MG tablet Take 1 tablet by mouth daily 90 tablet 3    sertraline (ZOLOFT) 50 MG tablet TAKE ONE TABLET BY MOUTH DAILY 90 tablet 3    nystatin (MYCOSTATIN) 767280 UNIT/GM ointment APPLY TO AFFECTED AREA(S) TWO TIMES A DAY 30 g 0    Cholecalciferol (VITAMIN D3) 1.25 MG (06951 UT) CAPS Take by mouth Daily      aspirin 81 MG chewable tablet Take 1 tablet by mouth           Vitals:    01/27/25 1024 01/27/25 1035   BP: 100/74 114/80   Pulse: 84    Temp: 97.1 °F (36.2 °C)    TempSrc: Temporal    SpO2: 97%    Weight: 133.4 kg (294 lb)    Height: 1.702 m (5' 7\")      Body mass index is 46.05 kg/m².     Wt Readings from Last 3 Encounters:   01/27/25 133.4 kg (294 lb)   07/29/24 (!) 140.5 kg (309 lb 12.8 oz)   02/15/24 (!) 154 kg (339 lb 9.6 oz)     BP Readings from Last 3 Encounters:   01/27/25 114/80   07/29/24 110/84   02/15/24 132/86        Chief Complaint   Patient presents with    6 Month Follow-Up     ^rtc/6months    Weight Management     Zepbound needs prescription change to 10 mg... no longer on the 5mg listed in the system         Assessment/Plan:    Misti \"Anoop\" was seen today for 6 month follow-up and weight management.    Diagnoses and all orders for this visit:    Essential hypertension  -     Basic Metabolic Panel; Future    Morbid obesity    Chronic seasonal

## 2025-01-28 LAB
ANION GAP SERPL CALCULATED.3IONS-SCNC: 13 MMOL/L (ref 3–16)
BUN SERPL-MCNC: 21 MG/DL (ref 7–20)
CALCIUM SERPL-MCNC: 9.6 MG/DL (ref 8.3–10.6)
CHLORIDE SERPL-SCNC: 101 MMOL/L (ref 99–110)
CO2 SERPL-SCNC: 26 MMOL/L (ref 21–32)
CREAT SERPL-MCNC: 0.9 MG/DL (ref 0.6–1.2)
GFR SERPLBLD CREATININE-BSD FMLA CKD-EPI: 73 ML/MIN/{1.73_M2}
GLUCOSE SERPL-MCNC: 93 MG/DL (ref 70–99)
POTASSIUM SERPL-SCNC: 3.8 MMOL/L (ref 3.5–5.1)
SODIUM SERPL-SCNC: 140 MMOL/L (ref 136–145)

## 2025-02-18 RX ORDER — HYDROCHLOROTHIAZIDE 25 MG/1
25 TABLET ORAL EVERY MORNING
Qty: 90 TABLET | Refills: 3 | OUTPATIENT
Start: 2025-02-18

## 2025-02-18 NOTE — TELEPHONE ENCOUNTER
Medication:   Requested Prescriptions     Pending Prescriptions Disp Refills    hydroCHLOROthiazide (HYDRODIURIL) 25 MG tablet [Pharmacy Med Name: hydroCHLOROthiazide 25 MG TABLET] 90 tablet 3     Sig: TAKE ONE TABLET BY MOUTH EVERY MORNING        Last Filled:  1/27/2025    Patient Phone Number: [phone] (home)     Last appt: 1/27/2025   Next appt: 5/16/2025    Last OARRS:        No data to display

## 2025-05-12 RX ORDER — COVID-19 ANTIGEN TEST
KIT MISCELLANEOUS
COMMUNITY
End: 2025-05-16

## 2025-05-16 ENCOUNTER — OFFICE VISIT (OUTPATIENT)
Dept: FAMILY MEDICINE CLINIC | Age: 60
End: 2025-05-16

## 2025-05-16 VITALS
BODY MASS INDEX: 45.52 KG/M2 | HEIGHT: 67 IN | SYSTOLIC BLOOD PRESSURE: 118 MMHG | WEIGHT: 290 LBS | HEART RATE: 84 BPM | DIASTOLIC BLOOD PRESSURE: 76 MMHG

## 2025-05-16 DIAGNOSIS — I10 ESSENTIAL HYPERTENSION: Primary | ICD-10-CM

## 2025-05-16 DIAGNOSIS — F41.9 ANXIETY: ICD-10-CM

## 2025-05-16 DIAGNOSIS — Z00.00 HEALTHCARE MAINTENANCE: ICD-10-CM

## 2025-05-16 DIAGNOSIS — R73.9 ELEVATED BLOOD SUGAR: ICD-10-CM

## 2025-05-16 DIAGNOSIS — E66.01 MORBID OBESITY (HCC): ICD-10-CM

## 2025-05-16 RX ORDER — NALTREXONE HYDROCHLORIDE 50 MG/1
TABLET, FILM COATED ORAL
COMMUNITY
Start: 2025-04-15

## 2025-05-16 NOTE — PROGRESS NOTES
Misti Rodriguez   YOB: 1965    Date of Visit:  5/16/2025    Allergies   Allergen Reactions    Balsam Rash    Balsam Peru-Castor Oil [Balsam [place]-Castor Oil] Rash     Contact dermatitis - typically found in hemorroid cream     Outpatient Medications Marked as Taking for the 5/16/25 encounter (Office Visit) with Viviane Balderrama MD   Medication Sig Dispense Refill    naltrexone (DEPADE) 50 MG tablet Take 1/2 tablet daily for 1 week, then take 1 tablet daily thereafter      hydroCHLOROthiazide 12.5 MG capsule Take 1 capsule by mouth every morning 90 capsule 1    Tirzepatide-Weight Management (ZEPBOUND) 5 MG/0.5ML SOAJ Inject 12.5 mg into the skin      montelukast (SINGULAIR) 10 MG tablet Take 1 tablet by mouth daily 90 tablet 3    losartan (COZAAR) 100 MG tablet Take 1 tablet by mouth daily 90 tablet 3    sertraline (ZOLOFT) 50 MG tablet TAKE ONE TABLET BY MOUTH DAILY 90 tablet 3    nystatin (MYCOSTATIN) 038347 UNIT/GM ointment APPLY TO AFFECTED AREA(S) TWO TIMES A DAY 30 g 0    Cholecalciferol (VITAMIN D3) 1.25 MG (43734 UT) CAPS Take by mouth Daily      aspirin 81 MG chewable tablet Take 1 tablet by mouth           Vitals:    05/16/25 1044   BP: 118/76   Pulse: 84   Weight: 131.5 kg (290 lb)   Height: 1.702 m (5' 7\")     Body mass index is 45.42 kg/m².     Wt Readings from Last 3 Encounters:   05/16/25 131.5 kg (290 lb)   01/27/25 133.4 kg (294 lb)   07/29/24 (!) 140.5 kg (309 lb 12.8 oz)     BP Readings from Last 3 Encounters:   05/16/25 118/76   01/27/25 114/80   07/29/24 110/84        Chief Complaint   Patient presents with    Hypertension         Assessment/Plan:    Assessment & Plan  Hypertension:  - Blood pressure readings are within normal range.  - Discussion about potentially stopping hydrochlorothiazide; patient prefers to continue current regimen.  - Continue current regimen of losartan and hydrochlorothiazide.    Anxiety:  - Anxiety symptoms are stable.  - Effective response to sertraline

## 2025-08-15 DIAGNOSIS — J30.2 SEASONAL ALLERGIES: ICD-10-CM

## 2025-08-15 RX ORDER — MONTELUKAST SODIUM 10 MG/1
10 TABLET ORAL DAILY
Qty: 90 TABLET | Refills: 3 | OUTPATIENT
Start: 2025-08-15

## 2025-08-15 RX ORDER — MONTELUKAST SODIUM 10 MG/1
10 TABLET ORAL DAILY
Qty: 90 TABLET | Refills: 3 | Status: SHIPPED | OUTPATIENT
Start: 2025-08-15

## 2025-08-15 RX ORDER — HYDROCHLOROTHIAZIDE 12.5 MG/1
12.5 CAPSULE ORAL EVERY MORNING
Qty: 90 CAPSULE | Refills: 1 | Status: SHIPPED | OUTPATIENT
Start: 2025-08-15

## 2025-08-18 RX ORDER — HYDROCHLOROTHIAZIDE 12.5 MG/1
12.5 CAPSULE ORAL EVERY MORNING
Qty: 90 CAPSULE | Refills: 1 | OUTPATIENT
Start: 2025-08-18